# Patient Record
Sex: FEMALE | Race: WHITE | NOT HISPANIC OR LATINO | Employment: FULL TIME | URBAN - METROPOLITAN AREA
[De-identification: names, ages, dates, MRNs, and addresses within clinical notes are randomized per-mention and may not be internally consistent; named-entity substitution may affect disease eponyms.]

---

## 2017-05-04 ENCOUNTER — TELEPHONE (OUTPATIENT)
Dept: OBGYN | Facility: CLINIC | Age: 27
End: 2017-05-04

## 2017-05-04 NOTE — TELEPHONE ENCOUNTER
Pt lvm on nurse line stating she has been taking the birth control we gave her since she delivered in October 2016 and she has not gotten her period in 2 months and we are responsible since we gave her the birth control, I returned her call and pt says we need to see her because we gave her the birth control. I let pt know she would need to establish as a GYN patient or to see her primary care, she started yelling saying we have to see her I explained again that we will see her but she needs an appt as a new GYN pt. She continued to yell and curse then she hung up.

## 2017-07-02 ENCOUNTER — HOSPITAL ENCOUNTER (EMERGENCY)
Facility: MEDICAL CENTER | Age: 27
End: 2017-07-02
Attending: EMERGENCY MEDICINE
Payer: OTHER GOVERNMENT

## 2017-07-02 VITALS
RESPIRATION RATE: 14 BRPM | OXYGEN SATURATION: 98 % | HEIGHT: 67 IN | DIASTOLIC BLOOD PRESSURE: 73 MMHG | WEIGHT: 249.12 LBS | TEMPERATURE: 98.3 F | HEART RATE: 80 BPM | SYSTOLIC BLOOD PRESSURE: 122 MMHG | BODY MASS INDEX: 39.1 KG/M2

## 2017-07-02 DIAGNOSIS — J02.9 SORE THROAT: ICD-10-CM

## 2017-07-02 LAB
DEPRECATED S PYO AG THROAT QL EIA: NORMAL
SIGNIFICANT IND 70042: NORMAL
SITE SITE: NORMAL
SOURCE SOURCE: NORMAL

## 2017-07-02 PROCEDURE — 99283 EMERGENCY DEPT VISIT LOW MDM: CPT

## 2017-07-02 PROCEDURE — 87880 STREP A ASSAY W/OPTIC: CPT

## 2017-07-02 PROCEDURE — 87081 CULTURE SCREEN ONLY: CPT

## 2017-07-02 ASSESSMENT — PAIN SCALES - GENERAL: PAINLEVEL_OUTOF10: 8

## 2017-07-02 NOTE — ED AVS SNAPSHOT
Psynova Neurotech Access Code: Activation code not generated  Current Psynova Neurotech Status: Active    [employer]  A secure, online tool to manage your health information     [employer]’s Psynova Neurotech® is a secure, online tool that connects you to your personalized health information from the privacy of your home -- day or night - making it very easy for you to manage your healthcare. Once the activation process is completed, you can even access your medical information using the Psynova Neurotech kiran, which is available for free in the Apple Kiran store or Google Play store.     Psynova Neurotech provides the following levels of access (as shown below):   My Chart Features   [facility] Primary Care Doctor [facility]  Specialists [facility]  Urgent  Care Non-[facility]  Primary Care  Doctor   Email your healthcare team securely and privately 24/7 X X X X   Manage appointments: schedule your next appointment; view details of past/upcoming appointments X      Request prescription refills. X      View recent personal medical records, including lab and immunizations X X X X   View health record, including health history, allergies, medications X X X X   Read reports about your outpatient visits, procedures, consult and ER notes X X X X   See your discharge summary, which is a recap of your hospital and/or ER visit that includes your diagnosis, lab results, and care plan. X X       How to register for Psynova Neurotech:  1. Go to  [email or web address].  2. Click on the Sign Up Now box, which takes you to the New Member Sign Up page. You will need to provide the following information:  a. Enter your Psynova Neurotech Access Code exactly as it appears at the top of this page. (You will not need to use this code after you’ve completed the sign-up process. If you do not sign up before the expiration date, you must request a new code.)   b. Enter your date of birth.   c. Enter your home email address.   d. Click Submit, and follow the next screen’s instructions.  3. Create a Psynova Neurotech ID. This will  be your [employer] login ID and cannot be changed, so think of one that is secure and easy to remember.  4. Create a [employer] password. You can change your password at any time.  5. Enter your Password Reset Question and Answer. This can be used at a later time if you forget your password.   6. Enter your e-mail address. This allows you to receive e-mail notifications when new information is available in [employer].  7. Click Sign Up. You can now view your health information.    For assistance activating your [employer] account, call [phone]

## 2017-07-02 NOTE — ED AVS SNAPSHOT
Home Care Instructions                                                                                                                Marie Gan   MRN: [id number]    Department:  [facility], Emergency Dept   Date of Visit:  7/2/2017            [facility], Emergency Dept    [address] [address]    Phone:  [phone]      You were seen by     Teddy Bui D.O.      Your Diagnosis Was     Sore throat     J02.9       Follow-up Information     1. Follow up with [facility]. Call in 1 day.    Contact information    [address] [address] [phone]        2. Follow up with [facility], Emergency Dept.    Specialty:  Emergency Medicine    Why:  If symptoms worsen    Contact information    [address] [facility] [address] [phone]      Medication Information     Review all of your home medications and newly ordered medications with your primary doctor and/or pharmacist as soon as possible. Follow medication instructions as directed by your doctor and/or pharmacist.     Please keep your complete medication list with you and share with your physician. Update the information when medications are discontinued, doses are changed, or new medications (including over-the-counter products) are added; and carry medication information at all times in the event of emergency situations.               Medication List      ASK your doctor about these medications        Instructions    Morning Afternoon Evening Bedtime    norgestimate-ethinyl estradiol 0.25-35 MG-MCG per tablet   Commonly known as:  ORTHO-CYCLEN        Take 1 Tab by mouth every day.   Dose:  1 Tab                                Procedures and tests performed during your visit     BETA STREP SCREEN (GP. A)    RAPID STREP, CULT IF INDICATED (CULTURE IF NEGATIVE)        Discharge Instructions       Pharyngitis  Pharyngitis is redness, pain, and  swelling (inflammation) of your pharynx.   CAUSES   Pharyngitis is usually caused by infection. Most of the time, these infections are from viruses (viral) and are part of a cold. However, sometimes pharyngitis is caused by bacteria (bacterial). Pharyngitis can also be caused by allergies. Viral pharyngitis may be spread from person to person by coughing, sneezing, and personal items or utensils (cups, forks, spoons, toothbrushes). Bacterial pharyngitis may be spread from person to person by more intimate contact, such as kissing.   SIGNS AND SYMPTOMS   Symptoms of pharyngitis include:    · Sore throat.    · Tiredness (fatigue).    · Low-grade fever.    · Headache.  · Joint pain and muscle aches.  · Skin rashes.  · Swollen lymph nodes.  · Plaque-like film on throat or tonsils (often seen with bacterial pharyngitis).  DIAGNOSIS   Your health care provider will ask you questions about your illness and your symptoms. Your medical history, along with a physical exam, is often all that is needed to diagnose pharyngitis. Sometimes, a rapid strep test is done. Other lab tests may also be done, depending on the suspected cause.   TREATMENT   Viral pharyngitis will usually get better in 3-4 days without the use of medicine. Bacterial pharyngitis is treated with medicines that kill germs (antibiotics).   HOME CARE INSTRUCTIONS   · Drink enough water and fluids to keep your urine clear or pale yellow.    · Only take over-the-counter or prescription medicines as directed by your health care provider:    · If you are prescribed antibiotics, make sure you finish them even if you start to feel better.    · Do not take aspirin.    · Get lots of rest.    · Gargle with 8 oz of salt water (½ tsp of salt per 1 qt of water) as often as every 1-2 hours to soothe your throat.    · Throat lozenges (if you are not at risk for choking) or sprays may be used to soothe your throat.  SEEK MEDICAL CARE IF:   · You have large, tender lumps in  your neck.  · You have a rash.  · You cough up green, yellow-brown, or bloody spit.  SEEK IMMEDIATE MEDICAL CARE IF:   · Your neck becomes stiff.  · You drool or are unable to swallow liquids.  · You vomit or are unable to keep medicines or liquids down.  · You have severe pain that does not go away with the use of recommended medicines.  · You have trouble breathing (not caused by a stuffy nose).  MAKE SURE YOU:   · Understand these instructions.  · Will watch your condition.  · Will get help right away if you are not doing well or get worse.     This information is not intended to replace advice given to you by your health care provider. Make sure you discuss any questions you have with your health care provider.     Document Released: 12/18/2006 Document Revised: 10/08/2014 Document Reviewed: 08/25/2014  [employer] Interactive Patient Education ©2016 Elsevier Inc.    Salt Water Gargle  This solution will help make your mouth and throat feel better.  HOME CARE INSTRUCTIONS   · Mix 1 teaspoon of salt in 8 ounces of warm water.  · Gargle with this solution as much or often as you need or as directed. Swish and gargle gently if you have any sores or wounds in your mouth.  · Do not swallow this mixture.     This information is not intended to replace advice given to you by your health care provider. Make sure you discuss any questions you have with your health care provider.     Document Released: 09/21/2005 Document Revised: 03/11/2013 Document Reviewed: 02/12/2010  [employer] Patient Education ©2016 Elsevier Inc.    Sore Throat  A sore throat is pain, burning, irritation, or scratchiness of the throat. There is often pain or tenderness when swallowing or talking. A sore throat may be accompanied by other symptoms, such as coughing, sneezing, fever, and swollen neck glands. A sore throat is often the first sign of another sickness, such as a cold, flu, strep throat, or mononucleosis (commonly known as mono).  Most sore throats go away without medical treatment.  CAUSES   The most common causes of a sore throat include:  · A viral infection, such as a cold, flu, or mono.  · A bacterial infection, such as strep throat, tonsillitis, or whooping cough.  · Seasonal allergies.  · Dryness in the air.  · Irritants, such as smoke or pollution.  · Gastroesophageal reflux disease (GERD).  HOME CARE INSTRUCTIONS   · Only take over-the-counter medicines as directed by your caregiver.  · Drink enough fluids to keep your urine clear or pale yellow.  · Rest as needed.  · Try using throat sprays, lozenges, or sucking on hard candy to ease any pain (if older than 4 years or as directed).  · Sip warm liquids, such as broth, herbal tea, or warm water with honey to relieve pain temporarily. You may also eat or drink cold or frozen liquids such as frozen ice pops.  · Gargle with salt water (mix 1 tsp salt with 8 oz of water).  · Do not smoke and avoid secondhand smoke.  · Put a cool-mist humidifier in your bedroom at night to moisten the air. You can also turn on a hot shower and sit in the bathroom with the door closed for 5-10 minutes.  SEEK IMMEDIATE MEDICAL CARE IF:  · You have difficulty breathing.  · You are unable to swallow fluids, soft foods, or your saliva.  · You have increased swelling in the throat.  · Your sore throat does not get better in 7 days.  · You have nausea and vomiting.  · You have a fever or persistent symptoms for more than 2-3 days.  · You have a fever and your symptoms suddenly get worse.  MAKE SURE YOU:   · Understand these instructions.  · Will watch your condition.  · Will get help right away if you are not doing well or get worse.     This information is not intended to replace advice given to you by your health care provider. Make sure you discuss any questions you have with your health care provider.     Document Released: 01/25/2006 Document Revised: 01/08/2016 Document Reviewed: 08/25/2013  [place]  Interactive Patient Education ©2016 Elsevier Inc.            Patient Information     Patient Information    Following emergency treatment: all patient requiring follow-up care must return either to a private physician or a clinic if your condition worsens before you are able to obtain further medical attention, please return to the emergency room.     Billing Information    At [facility], we work to make the billing process streamlined for our patients.  Our Representatives are here to answer any questions you may have regarding your hospital bill.  If you have insurance coverage and have supplied your insurance information to us, we will submit a claim to your insurer on your behalf.  Should you have any questions regarding your bill, we can be reached online or by phone as follows:  Online: You are able pay your bills online or live chat with our representatives about any billing questions you may have. We are here to help Monday - Friday from 8:00am to 7:30pm and 9:00am - 12:00pm on Saturdays.  Please visit [email or web address]  for more information.   Phone:  [phone] or [phone]    Please note that your emergency physician, surgeon, pathologist, radiologist, anesthesiologist, and other specialists are not employed by [facility] and will therefore bill separately for their services.  Please contact them directly for any questions concerning their bills at the numbers below:     Emergency Physician Services:  [phone]  [facility]:  [phone]  [facility]:  [phone]  [facility]:  [phone]    1. Your final bill may vary from the amount quoted upon discharge if all procedures are not complete at that time, or if your doctor has additional procedures of which we are not aware. You will receive an additional bill if you return to the Emergency Department at [facility] for suture removal regardless of the  facility of which the sutures were placed.     2. Please arrange for settlement of this account at the emergency registration.    3. All self-pay accounts are due in full at the time of treatment.  If you are unable to meet this obligation then payment is expected within 4-5 days.     4. If you have had radiology studies (CT, X-ray, Ultrasound, MRI), you have received a preliminary result during your emergency department visit. Please contact the radiology department [phone] to receive a copy of your final result. Please discuss the Final result with your primary physician or with the follow up physician provided.     Crisis Hotline:  [place] Crisis Hotline:  1-882-OCFWKAY or 1-847.600.5622  [place] Crisis Hotline:    1-205.754.5027 or 446-352-6144         ED Discharge Follow Up Questions    1. In order to provide you with very good care, we would like to follow up with a phone call in the next few days.  May we have your permission to contact you?     YES /  NO    2. What is the best phone number to call you? (       )_____-__________    3. What is the best time to call you?      Morning  /  Afternoon  /  Evening                   Patient Signature:  ____________________________________________________________    Date:  ____________________________________________________________

## 2017-07-02 NOTE — ED AVS SNAPSHOT
7/2/2017    Marie Gan  [address]   [place] [address]    Dear Marie:    [facility] wants to ensure your discharge home is safe and you or your loved ones have had all of your questions answered regarding your care after you leave the hospital.    Below is a list of resources and contact information should you have any questions regarding your hospital stay, follow-up instructions, or active medical symptoms.    Questions or Concerns Regarding… Contact   Medical Questions Related to Your Discharge  (7 days a week, 8am-5pm) Contact a Nurse Care Coordinator   [phone]   Medical Questions Not Related to Your Discharge  (24 hours a day / 7 days a week)  Contact the Nurse Health Line   [phone]    Medications or Discharge Instructions Refer to your discharge packet   or contact your [facility] Primary Care Provider   [phone]   Follow-up Appointment(s) Schedule your appointment via [employer]   or contact Scheduling [phone]   Billing Review your statement via [employer]  or contact Billing [phone]   Medical Records Review your records via [employer]   or contact Medical Records [phone]     You may receive a telephone call within two days of discharge. This call is to make certain you understand your discharge instructions and have the opportunity to have any questions answered. You can also easily access your medical information, test results and upcoming appointments via the [employer] free online health management tool. You can learn more and sign up at [employer]. For assistance setting up your [employer] account, please call [phone].    Once again, we want to ensure your discharge home is safe and that you have a clear understanding of any next steps in your care. If you have any questions or concerns, please do not hesitate to contact us, we are here for you. Thank you for choosing [facility] for your healthcare needs.    Sincerely,    Your [facility] Healthcare Team

## 2017-07-03 NOTE — ED NOTES
"Chief Complaint   Patient presents with   • Sore Throat     Pt got back from [facility] and had a sore throat, said her uvula was enlarged and red, and has white patches in the back of her throat.  Reports pain when swallowing. Back of throat appears red, white patches noted. Pt speaking in full sentences, maintaining airway.        /74 mmHg  Pulse 77  Temp(Src) 37.1 °C (98.7 °F)  Resp 18  Ht 1.702 m (5' 7\")  Wt 113 kg (249 lb 1.9 oz)  BMI 39.01 kg/m2  SpO2 98%      Pt Informed regarding triage process and verbalized understanding to inform triage tech or RN for any changes in condition.  Placed in lobby.    "

## 2017-07-03 NOTE — DISCHARGE INSTRUCTIONS
Pharyngitis  Pharyngitis is redness, pain, and swelling (inflammation) of your pharynx.   CAUSES   Pharyngitis is usually caused by infection. Most of the time, these infections are from viruses (viral) and are part of a cold. However, sometimes pharyngitis is caused by bacteria (bacterial). Pharyngitis can also be caused by allergies. Viral pharyngitis may be spread from person to person by coughing, sneezing, and personal items or utensils (cups, forks, spoons, toothbrushes). Bacterial pharyngitis may be spread from person to person by more intimate contact, such as kissing.   SIGNS AND SYMPTOMS   Symptoms of pharyngitis include:    · Sore throat.    · Tiredness (fatigue).    · Low-grade fever.    · Headache.  · Joint pain and muscle aches.  · Skin rashes.  · Swollen lymph nodes.  · Plaque-like film on throat or tonsils (often seen with bacterial pharyngitis).  DIAGNOSIS   Your health care provider will ask you questions about your illness and your symptoms. Your medical history, along with a physical exam, is often all that is needed to diagnose pharyngitis. Sometimes, a rapid strep test is done. Other lab tests may also be done, depending on the suspected cause.   TREATMENT   Viral pharyngitis will usually get better in 3-4 days without the use of medicine. Bacterial pharyngitis is treated with medicines that kill germs (antibiotics).   HOME CARE INSTRUCTIONS   · Drink enough water and fluids to keep your urine clear or pale yellow.    · Only take over-the-counter or prescription medicines as directed by your health care provider:    · If you are prescribed antibiotics, make sure you finish them even if you start to feel better.    · Do not take aspirin.    · Get lots of rest.    · Gargle with 8 oz of salt water (½ tsp of salt per 1 qt of water) as often as every 1-2 hours to soothe your throat.    · Throat lozenges (if you are not at risk for choking) or sprays may be used to soothe your throat.  SEEK MEDICAL  CARE IF:   · You have large, tender lumps in your neck.  · You have a rash.  · You cough up green, yellow-brown, or bloody spit.  SEEK IMMEDIATE MEDICAL CARE IF:   · Your neck becomes stiff.  · You drool or are unable to swallow liquids.  · You vomit or are unable to keep medicines or liquids down.  · You have severe pain that does not go away with the use of recommended medicines.  · You have trouble breathing (not caused by a stuffy nose).  MAKE SURE YOU:   · Understand these instructions.  · Will watch your condition.  · Will get help right away if you are not doing well or get worse.     This information is not intended to replace advice given to you by your health care provider. Make sure you discuss any questions you have with your health care provider.     Document Released: 12/18/2006 Document Revised: 10/08/2014 Document Reviewed: 08/25/2014  [employer] Interactive Patient Education ©2016 Elsevier Inc.    Salt Water Gargle  This solution will help make your mouth and throat feel better.  HOME CARE INSTRUCTIONS   · Mix 1 teaspoon of salt in 8 ounces of warm water.  · Gargle with this solution as much or often as you need or as directed. Swish and gargle gently if you have any sores or wounds in your mouth.  · Do not swallow this mixture.     This information is not intended to replace advice given to you by your health care provider. Make sure you discuss any questions you have with your health care provider.     Document Released: 09/21/2005 Document Revised: 03/11/2013 Document Reviewed: 02/12/2010  [employer] Patient Education ©2016 Elsevier Inc.    Sore Throat  A sore throat is pain, burning, irritation, or scratchiness of the throat. There is often pain or tenderness when swallowing or talking. A sore throat may be accompanied by other symptoms, such as coughing, sneezing, fever, and swollen neck glands. A sore throat is often the first sign of another sickness, such as a cold, flu, strep  throat, or mononucleosis (commonly known as mono). Most sore throats go away without medical treatment.  CAUSES   The most common causes of a sore throat include:  · A viral infection, such as a cold, flu, or mono.  · A bacterial infection, such as strep throat, tonsillitis, or whooping cough.  · Seasonal allergies.  · Dryness in the air.  · Irritants, such as smoke or pollution.  · Gastroesophageal reflux disease (GERD).  HOME CARE INSTRUCTIONS   · Only take over-the-counter medicines as directed by your caregiver.  · Drink enough fluids to keep your urine clear or pale yellow.  · Rest as needed.  · Try using throat sprays, lozenges, or sucking on hard candy to ease any pain (if older than 4 years or as directed).  · Sip warm liquids, such as broth, herbal tea, or warm water with honey to relieve pain temporarily. You may also eat or drink cold or frozen liquids such as frozen ice pops.  · Gargle with salt water (mix 1 tsp salt with 8 oz of water).  · Do not smoke and avoid secondhand smoke.  · Put a cool-mist humidifier in your bedroom at night to moisten the air. You can also turn on a hot shower and sit in the bathroom with the door closed for 5-10 minutes.  SEEK IMMEDIATE MEDICAL CARE IF:  · You have difficulty breathing.  · You are unable to swallow fluids, soft foods, or your saliva.  · You have increased swelling in the throat.  · Your sore throat does not get better in 7 days.  · You have nausea and vomiting.  · You have a fever or persistent symptoms for more than 2-3 days.  · You have a fever and your symptoms suddenly get worse.  MAKE SURE YOU:   · Understand these instructions.  · Will watch your condition.  · Will get help right away if you are not doing well or get worse.     This information is not intended to replace advice given to you by your health care provider. Make sure you discuss any questions you have with your health care provider.     Document Released: 01/25/2006 Document Revised:  01/08/2016 Document Reviewed: 08/25/2013  [employer] Interactive Patient Education ©2016 Elsevier Inc.

## 2017-07-03 NOTE — ED PROVIDER NOTES
ED Provider Note    Scribed for No att. providers found by Tiffanie Horner. 7/2/2017  8:38 PM    Primary care provider: Pcp Pt States None   History obtained from: Patient   History limited by: None     CHIEF COMPLAINT  Chief Complaint   Patient presents with   • Sore Throat        HPI  Marei Gan is a 26 y.o. female who presents to the ED complaining of a sore throat with associated painful swallowing starting earlier today when she woke up. Associated symptoms include congestion. Patient notes just getting back from camping when she noticed her uvula was enlarged, erythematous and swollen. Denies fever, runny nose, cough, dyspnea, nausea, vomiting, diarrhea, rash, urinary problems, pregnancy.      REVIEW OF SYSTEMS  Please see HPI for pertinent positives/negatives.     PAST MEDICAL HISTORY  History reviewed. No pertinent past medical history.     SURGICAL HISTORY  Past Surgical History   Procedure Laterality Date   • Primary c section  10/5/2016     Procedure: PRIMARY C SECTION;  Surgeon: Margarette Arriaga M.D.;  Location: LABOR AND DELIVERY;  Service:         SOCIAL HISTORY  Social History     Social History Main Topics   • Smoking status: Former Smoker   • Smokeless tobacco: Never Used      Comment: quit due to pregnancy 3/16   • Alcohol Use: No   • Drug Use: No   • Sexual Activity:     Partners: Male      Comment: None, unplanned pregnancy but desire        FAMILY HISTORY  Family History   Problem Relation Age of Onset   • Cancer Maternal Grandmother    • Cancer Paternal Grandmother      breast cancer   • Diabetes Paternal Grandfather         CURRENT MEDICATIONS  Home Medications     Reviewed by Kalani Dumont R.N. (Registered Nurse) on 07/02/17 at 2026  Med List Status: Complete    Medication Last Dose Status    norgestimate-ethinyl estradiol (ORTHO-CYCLEN) 0.25-35 MG-MCG per tablet  Active                 ALLERGIES  No Known Allergies     PHYSICAL EXAM  VITAL SIGNS: /74 mmHg   "Pulse 77  Temp(Src) 37.1 °C (98.7 °F)  Resp 18  Ht 1.702 m (5' 7\")  Wt 113 kg (249 lb 1.9 oz)  BMI 39.01 kg/m2  SpO2 98% @MEG[200176::@     Pulse ox interpretation: 98% I interpret this pulse ox as normal     Constitutional: Well developed, well nourished, alert in no apparent distress, nontoxic appearance    HENT: No external signs of trauma, normocephalic, bilateral external ears normal, oropharynx moist with mild bilateral tonsillar erythema, no rigidity, uvula midline without any swelling, patient with normal voice and speaking without any difficulty, nose normal    Eyes: PERRL, conjunctiva without erythema, no discharge, no icterus    Neck: Soft and supple, trachea midline, no stridor, no tenderness, no LAD, no JVD, good ROM    Cardiovascular: Regular rate and rhythm, no murmurs/rubs/gallops, strong distal pulses and good perfusion    Thorax & Lungs: No respiratory distress, CTAB, no chest tenderness    Abdomen: Soft, nontender, nondistended, no guarding, no rebound, normal BS    Extremities: No clubbing, no cyanosis, no edema, no gross deformity, good ROM, no tenderness, intact distal pulses with brisk cap refill    Skin: Warm, dry, no pallor/cyanosis, no rash noted    Lymphatic: No lymphadenopathy noted    Neuro: A/O times 3, no focal deficits noted    Psychiatric: Cooperative, normal mood and affect, normal judgement, appropriate for clinical situation        DIAGNOSTIC STUDIES / PROCEDURES    LABS  All labs reviewed by me.     Results for orders placed or performed during the hospital encounter of 07/02/17   RAPID STREP, CULT IF INDICATED (CULTURE IF NEGATIVE)   Result Value Ref Range    Significant Indicator NEG     Source THRT     Site THROAT     Rapid Strep Screen       Negative for Group A streptococcus.  A negative result may be obtained if the specimen is  inadequate or antigen concentration is below the  sensitivity of the test. This negative test will be followed  up with a culture as " requested.          COURSE & MEDICAL DECISION MAKING  Nursing notes, VS, PMSFHx reviewed in chart.       Differential diagnoses considered include but are not limited to: URI, pneumonia, bronchitis, influenza, laryngitis, pharyngitis/tonsillitis, epiglottitis, peritonsillar abscess, retropharyngeal abscess, sinusitis, mononucleosis, viral syndrome, allergic rhinitis, otitis media    8:40 PM - Patient was evaluated at [facility]. Discussed the treatment care plan with her which is to get a swab to rule out strep throat. Patient does not have any further questions at this time. Rapid strep was ordered to evaluate for strep throat.      9:33 PM - Recheck. Informed patient of her lab results which indicated negative strep. She was encouraged to gargle with salt water or mix a teaspoon or tablespoon of honey with warm tea or water for relief. Her symptoms should resolve within a couple of days but she was instructed to return to the ED if her symptoms do not resolve. She will be discharged home with an information sheet on pharyngitis.     Patient presents to the ED with above complaint. Rapid strep performed and was negative. Findings discussed with the patient. This is a pleasant well-appearing patient distress, nontoxic in appearance with normal vital signs. Her airway is patent without any signs of swelling or compromise. Given the history/exam/findings, I suspect she most likely has a viral pharyngitis. Patient was informed that we will contact her if her throat culture is positive for strep.    The patient is referred to a primary physician for blood pressure management, diabetic screening, and for all other preventative health concerns.       FINAL IMPRESSION  1. Sore throat           DISPOSITION  Patient will be discharged home in stable condition.       FOLLOW UP  [address] [address]  [phone]  Call in 1 day      [facility], Emergency Dept  [address]  [facility] [id number] [phone]    If symptoms worsen         OUTPATIENT MEDICATIONS  New Prescriptions    No medications on file           I, Tiffanie Horner (Scribe), am scribing for, and in the presence of, No att. providers found.    Electronically signed by: Tiffanie Horner (Scribe), 7/2/2017    I, No att. providers found personally performed the services described in this documentation, as scribed by Tiffanie Horner in my presence, and it is both accurate and complete.      Portions of this record were made with voice recognition software and by scribes.  Despite my review, spelling/grammar/context errors may still remain.  Interpretation of this chart should be taken in this context.

## 2017-07-03 NOTE — ED NOTES
Pt verbalized understanding of discharge and follow up instructions. VSS.  All questions answered, ambulates with steady gait to discharge.

## 2017-07-04 LAB
S PYO SPEC QL CULT: NORMAL
SIGNIFICANT IND 70042: NORMAL
SITE SITE: NORMAL
SOURCE SOURCE: NORMAL

## 2017-08-16 ENCOUNTER — NON-PROVIDER VISIT (OUTPATIENT)
Dept: OBGYN | Facility: CLINIC | Age: 27
End: 2017-08-16
Payer: MEDICAID

## 2017-08-16 DIAGNOSIS — Z32.01 POSITIVE PREGNANCY TEST: ICD-10-CM

## 2017-08-16 LAB
INT CON NEG: NEGATIVE
INT CON POS: POSITIVE
POC URINE PREGNANCY TEST: POSITIVE

## 2017-08-16 PROCEDURE — 81025 URINE PREGNANCY TEST: CPT | Performed by: OBSTETRICS & GYNECOLOGY

## 2017-09-11 ENCOUNTER — INITIAL PRENATAL (OUTPATIENT)
Dept: OBGYN | Facility: CLINIC | Age: 27
End: 2017-09-11
Payer: OTHER GOVERNMENT

## 2017-09-11 VITALS — DIASTOLIC BLOOD PRESSURE: 64 MMHG | WEIGHT: 246 LBS | BODY MASS INDEX: 38.53 KG/M2 | SYSTOLIC BLOOD PRESSURE: 114 MMHG

## 2017-09-11 DIAGNOSIS — N93.8 DUB (DYSFUNCTIONAL UTERINE BLEEDING): ICD-10-CM

## 2017-09-11 LAB — IN CLINIC OB SCAN: NORMAL

## 2017-09-11 PROCEDURE — 99214 OFFICE O/P EST MOD 30 MIN: CPT | Mod: 25 | Performed by: OBSTETRICS & GYNECOLOGY

## 2017-09-11 PROCEDURE — 76830 TRANSVAGINAL US NON-OB: CPT | Performed by: OBSTETRICS & GYNECOLOGY

## 2017-09-11 NOTE — PROGRESS NOTES
CC: Confirmation of Pregnancy    HPI: Pt is a 27 yo  lmp unknown (was taking ocps)  who presents for evaluation of amenorrhea.  She has had no bleeding since her last menses.  A urine pregnancy test was positive.  She denies vaginal spotting or pain.  She notes nausea and vomiting.      ROS: negative for dizziness, SOB, chest pain, palpitations, dysuria, vaginal discharge.    Blood pressure 114/64, weight 111.6 kg (246 lb), not currently breastfeeding.    GENERAL: Alert, in no apparent distress  PSYCHIATRIC: Appropriate affect, intact insight and judgement.  ABDOMEN: Soft, nontender, nondistended.  No palpable masses. No hepatosplenomegaly.   No rebound or guarding.  No inguinal lymphadenopathy.  BACK: No CVA tenderness  EXTREMITIES: No edema, no calf tenderness.    GENITOURINARY:  Normal external genitalia, no lesions.  Normal urethral meatus, no masses or tenderness.  Normal bladder without fullness or masses.  Vagina well estrogenized, no vaginal discharge or lesions.  Cervix normal length, nontender.  Uterus normal size, shape, and contour, nontender.  Adnexa nontender, no masses.  Normal anus and perineum.      TRANSVAGINAL ULTRASOUND - performed and interpreted by me    Single gestational sac present.  Yolk sac visualized.     Zimmerman intrauterine pregnancy with CRL measuring 2.24 cm, c/w 8+6/7 weeks EGA, positive fetal cardiac activity noted. EDC 18     No fluid in cul de sac.    Ovaries and cervix appear grossly normal. Cervical length = 3.87 cm.      ASSESSMENT/PLAN:  1. DUB visit - Zimmerman IUP at 8+6/7 wks.  Prenatal Vitamins.  F/U for NOB appointment 4 wks.  2. Hx of C/S at 28 wks for Cat 3 tracing.  Op report reviewed - no spontaneous labor, LSTCS.  3. N & V of pregnancy - handout given.

## 2017-10-06 ENCOUNTER — HOSPITAL ENCOUNTER (OUTPATIENT)
Facility: MEDICAL CENTER | Age: 27
End: 2017-10-06
Attending: PHYSICIAN ASSISTANT
Payer: OTHER GOVERNMENT

## 2017-10-06 ENCOUNTER — INITIAL PRENATAL (OUTPATIENT)
Dept: OBGYN | Facility: CLINIC | Age: 27
End: 2017-10-06
Payer: MEDICAID

## 2017-10-06 VITALS — WEIGHT: 245 LBS | BODY MASS INDEX: 38.37 KG/M2 | DIASTOLIC BLOOD PRESSURE: 70 MMHG | SYSTOLIC BLOOD PRESSURE: 110 MMHG

## 2017-10-06 DIAGNOSIS — Z34.82 PRENATAL CARE, SUBSEQUENT PREGNANCY IN SECOND TRIMESTER: ICD-10-CM

## 2017-10-06 DIAGNOSIS — O09.891 HISTORY OF PRETERM DELIVERY, CURRENTLY PREGNANT IN FIRST TRIMESTER: ICD-10-CM

## 2017-10-06 DIAGNOSIS — O09.92 SUPERVISION OF HIGH RISK PREGNANCY IN SECOND TRIMESTER: ICD-10-CM

## 2017-10-06 DIAGNOSIS — O09.92 SUPERVISION OF HIGH RISK PREGNANCY IN SECOND TRIMESTER: Primary | ICD-10-CM

## 2017-10-06 DIAGNOSIS — Z98.891 HISTORY OF C-SECTION: ICD-10-CM

## 2017-10-06 DIAGNOSIS — E66.09 OBESITY DUE TO EXCESS CALORIES WITHOUT SERIOUS COMORBIDITY, UNSPECIFIED CLASSIFICATION: ICD-10-CM

## 2017-10-06 PROBLEM — E66.9 OBESITY: Status: ACTIVE | Noted: 2017-10-06

## 2017-10-06 LAB
APPEARANCE UR: NORMAL
BILIRUB UR STRIP-MCNC: NORMAL MG/DL
COLOR UR AUTO: NORMAL
GLUCOSE UR STRIP.AUTO-MCNC: NEGATIVE MG/DL
KETONES UR STRIP.AUTO-MCNC: NEGATIVE MG/DL
LEUKOCYTE ESTERASE UR QL STRIP.AUTO: NORMAL
NITRITE UR QL STRIP.AUTO: NEGATIVE
PH UR STRIP.AUTO: 6 [PH] (ref 5–8)
PROT UR QL STRIP: NORMAL MG/DL
RBC UR QL AUTO: NORMAL
SP GR UR STRIP.AUTO: 1.01
UROBILINOGEN UR STRIP-MCNC: NORMAL MG/DL

## 2017-10-06 PROCEDURE — 81002 URINALYSIS NONAUTO W/O SCOPE: CPT | Performed by: PHYSICIAN ASSISTANT

## 2017-10-06 PROCEDURE — 90686 IIV4 VACC NO PRSV 0.5 ML IM: CPT | Performed by: PHYSICIAN ASSISTANT

## 2017-10-06 PROCEDURE — 87591 N.GONORRHOEAE DNA AMP PROB: CPT

## 2017-10-06 PROCEDURE — 87491 CHLMYD TRACH DNA AMP PROBE: CPT

## 2017-10-06 PROCEDURE — 59402 PR NEW OB HIGH RISK: CPT | Performed by: PHYSICIAN ASSISTANT

## 2017-10-06 PROCEDURE — 90471 IMMUNIZATION ADMIN: CPT | Performed by: PHYSICIAN ASSISTANT

## 2017-10-06 PROCEDURE — 88175 CYTOPATH C/V AUTO FLUID REDO: CPT

## 2017-10-06 RX ORDER — METOCLOPRAMIDE 10 MG/1
10 TABLET ORAL 4 TIMES DAILY
COMMUNITY
End: 2018-04-04

## 2017-10-06 NOTE — PROGRESS NOTES
NOB visit   Pt considering RC/S with BTL   Pt c/o nausea, and dome cramping. Denies any other complications.   # [phone]  FLU  vaccine given today, right deltoid. Screening checklist reviewed with pt.

## 2017-10-06 NOTE — LETTER
Cystic Fibrosis Carrier Testing  Marie Gan    The following information is about a blood test that can be done to determine if you and/or your partner carry the gene for cystic fibrosis.    WHAT IS CYSTIC FIBROSIS?  · Cystic fibrosis (CF) is an inherited disease that affects more than 25,000 American children and young adults.  · Symptoms of CF vary but include lung congestion, pneumonia, diarrhea and poor growth.  Most people with CF have severe medical problems and some die at a young age.  Others have so few symptoms they are unaware they have CF.  · CF does not affect intelligence.  · Although there is no cure for CF at this time, scientists are making progress in improving treatment and in searching for a cure.  In the past many people with CF  at a very young age.  Today, many are living into their 20’s and 30’s.    IS THERE A CHANCE MY BABY COULD HAVE CYSTIC FIBROSIS?  · You can have a child with CF even if there is no history in your family (see chart below).  · CF testing can help determine if you are a carrier and at risk to have a child with CF.  Note: if both parents are carriers, there is a 1 in 4 (25%) chance with each pregnancy that they will have a child with CF.  · Carriers have one normal CF gene and one altered CF gene.  · People with CF have two altered CF genes.  · Most people have two normal copies of the CF gene.    Approximate risk that a couple with no family history of cystic fibrosis will have a child with cystic fibrosis:    Ethnic background / Risk     couple:  1 in 2,500   couple:  1 in 15,000            couple:  1 in 8,000     American couple:  1 in 32,000     WHAT TESTING IS AVAILABLE?  · There is a blood test that can be done to find out if you or your partner is a carrier.  · It is important to understand that CF carrier testing does not detect all CF carriers.  · If the test shows that you are both CF carriers, you unborn baby can  be tested to find out if the baby has CF.    HOW MUCH DOES IT COST TO HAVE CYSTIC FIBROSIS CARRIER TESTING?  · Cost and insurance coverage for CF carrier testing vary depending upon the laboratory used and your insurance policy.  · The average cost for CF carrier testing is $780 per person.  · Your genetic counselor can provide you with more information about cystic fibrosis carrier testing.    _____  Yes, I am interested in discussing carrier testing with a genetic counselor.    _____  No, I am not interested in CF carrier testing or in receiving more information about CF carrier testing.      Client signature: ________________________________________  10/6/2017

## 2017-10-06 NOTE — PROGRESS NOTES
New ob visit. Pt sure of LMP and  confirms EDITA 4/17/18. Pt has hx of TAB without complications then primary C/S at 28wk for NRFHT - pt denies GDM, PIH or delivery complications. Pt went to hospital for decr FM and needed crash section as umbilical cord around fetus' neck. Baby doing very well now, though only a year old. Pt wants repeat C/S and BTL. Denies PMHx, no other SHx. No tob, etoh or drug use. Taking PNV and Reglan only. NKDA. Pt currently denies cramping, bleeding or pain. No FM.     PE: See below. Size c/w dates. Unable to hear FHT by Doppler, so BSUS done with single, viable IUP, +FM, +Cardiac activity at 160bpm.  PAP done by Medical student but overseen by myself.     A/P: 1. IUP at 12w3d - PAP, GC/CT today. PNL, AFP slip given to pt - to do after 15wk. US 6-7wk. RTC 4 wks. Flu vaccine given today  2. Hx PTD with C/S at 28 wk for NRFHT - desires repeat and BTL  3. Obese - 15lb weight gain recommended  4. Nausea - taking Reglan with good relief

## 2017-10-10 ENCOUNTER — TELEPHONE (OUTPATIENT)
Dept: OBGYN | Facility: CLINIC | Age: 27
End: 2017-10-10

## 2017-10-10 PROBLEM — R87.622 LGSIL PAP SMEAR OF VAGINA: Status: ACTIVE | Noted: 2017-10-10

## 2017-10-10 LAB
C TRACH DNA GENITAL QL NAA+PROBE: NEGATIVE
CYTOLOGY REG CYTOL: NORMAL
N GONORRHOEA DNA GENITAL QL NAA+PROBE: NEGATIVE
SPECIMEN SOURCE: NORMAL

## 2017-10-10 NOTE — TELEPHONE ENCOUNTER
----- Message from MADISON Coleman sent at 10/10/2017  9:00 AM PDT -----  Needs colpo please, also can tx BV if symptomatic at colpo appt.      10/10/17 0934 Pt notified of abnormal pap and pt states she has been having abnormal paps since she was 17y.o and last time she had an abnormal pap she had a colpo and she was told if this time is abnormal again she will have a colpo again. Explained to pt is actually what provider is recommending a colpo. Pt states she will call me back later today or tomorrow to let me know her schedule and that way we can schedule her colpo appt. Will wait for pt to call back to schedule colpo appt.     10/16/17 1305 pt has not return call. Left message for pt to call back.   10/20/17 1454 Left message for pt to call back.   10/24/17 pt have not return call. Will send letter today. AIDEN in chart and ppt desk.

## 2017-10-10 NOTE — LETTER
October 24, 2017          Dear Marie Gan,      Please call us regarding your care.  One of the nurses is available to speak with you Monday through Friday, 8 a.m to 11:30 a.m and 1 p.m. to 4 p.m.    Please call us at [phone]; thank you for your prompt attention.        Sincerely,    Luisa Guerra R.N.    Electronically Signed

## 2017-11-03 ENCOUNTER — ROUTINE PRENATAL (OUTPATIENT)
Dept: OBGYN | Facility: CLINIC | Age: 27
End: 2017-11-03
Payer: MEDICAID

## 2017-11-03 VITALS — DIASTOLIC BLOOD PRESSURE: 58 MMHG | BODY MASS INDEX: 37.75 KG/M2 | SYSTOLIC BLOOD PRESSURE: 116 MMHG | WEIGHT: 241 LBS

## 2017-11-03 DIAGNOSIS — Z98.891 HISTORY OF C-SECTION: ICD-10-CM

## 2017-11-03 DIAGNOSIS — O09.92 SUPERVISION OF HIGH RISK PREGNANCY IN SECOND TRIMESTER: ICD-10-CM

## 2017-11-03 DIAGNOSIS — O09.891 HISTORY OF PRETERM DELIVERY, CURRENTLY PREGNANT IN FIRST TRIMESTER: ICD-10-CM

## 2017-11-03 DIAGNOSIS — E66.09 OBESITY DUE TO EXCESS CALORIES WITHOUT SERIOUS COMORBIDITY, UNSPECIFIED CLASSIFICATION: ICD-10-CM

## 2017-11-03 DIAGNOSIS — R87.622 LGSIL PAP SMEAR OF VAGINA: ICD-10-CM

## 2017-11-03 PROCEDURE — 90040 PR PRENATAL FOLLOW UP: CPT | Performed by: NURSE PRACTITIONER

## 2017-11-03 NOTE — PROGRESS NOTES
Pt here today for OB follow up  Reports light flutters  WT: 241 lb  BP:  116/58  PNP and AFP not done yet, pt states she will get blood work next week on Monday.    Pt informed today regarding need for colposcopy. States no complaints today.    on 12/01  Good # [phone]

## 2017-11-03 NOTE — PROGRESS NOTES
S) Pt is a 26 y.o.   at 16w3d  gestation. Routine prenatal care today. No complaints. Discussed pap results and need for colpo. Will schedule today. Hasn't yet done blood work, but has appt for Monday.    Fetal movement Normal  Cramping no,  VB no  LOF no   Denies dysuria. Generally feels well today. Good self-care activities identified. Denies headaches, swelling, visual changes, or epigastric pain .     O) Blood pressure 116/58, weight 109.3 kg (241 lb), not currently breastfeeding.        Labs:       PNL: Not done       GCT: Too early       AFP: Not done yet       GBS: N/A       Pertinent ultrasound -        Scheduled 17    A) IUP at 16w3d       S=D         Patient Active Problem List    Diagnosis Date Noted   • History of C/S x 1 - considering repeat and BTL 10/06/2017     Priority: High   • LGSIL Pap smear of vagina 10/10/2017     Priority: Medium   • History of PTD at 28wk - C/S due to NRFHT 10/06/2017     Priority: Medium   • Supervision of high risk pregnancy in second trimester 10/06/2017     Priority: Medium   • Obesity 10/06/2017     Priority: Medium                 TDAP: no       FLU: no        BTL: yes       : no    P) s/s ptl vs general discomforts. Fetal movements reviewed. General ed and anticipatory guidance. Nutrition/exercise/vitamin. Plans breast Plans pp contraception- unsure  Continue PNV.

## 2017-11-14 ENCOUNTER — HOSPITAL ENCOUNTER (OUTPATIENT)
Dept: LAB | Facility: MEDICAL CENTER | Age: 27
End: 2017-11-14
Attending: PHYSICIAN ASSISTANT
Payer: OTHER GOVERNMENT

## 2017-11-14 DIAGNOSIS — O09.92 SUPERVISION OF HIGH RISK PREGNANCY IN SECOND TRIMESTER: ICD-10-CM

## 2017-11-14 LAB
ABO GROUP BLD: NORMAL
APPEARANCE UR: CLEAR
BACTERIA #/AREA URNS HPF: ABNORMAL /HPF
BASOPHILS # BLD AUTO: 0.3 % (ref 0–1.8)
BASOPHILS # BLD: 0.03 K/UL (ref 0–0.12)
BILIRUB UR QL STRIP.AUTO: NEGATIVE
BLD GP AB SCN SERPL QL: NORMAL
COLOR UR: ABNORMAL
CULTURE IF INDICATED INDCX: YES UA CULTURE
EOSINOPHIL # BLD AUTO: 0.05 K/UL (ref 0–0.51)
EOSINOPHIL NFR BLD: 0.6 % (ref 0–6.9)
EPI CELLS #/AREA URNS HPF: ABNORMAL /HPF
ERYTHROCYTE [DISTWIDTH] IN BLOOD BY AUTOMATED COUNT: 46.8 FL (ref 35.9–50)
GLUCOSE UR STRIP.AUTO-MCNC: NEGATIVE MG/DL
HBV SURFACE AG SER QL: NEGATIVE
HCT VFR BLD AUTO: 39.4 % (ref 37–47)
HGB BLD-MCNC: 12.9 G/DL (ref 12–16)
HIV 1+2 AB+HIV1 P24 AG SERPL QL IA: NON REACTIVE
HYALINE CASTS #/AREA URNS LPF: ABNORMAL /LPF
IMM GRANULOCYTES # BLD AUTO: 0.03 K/UL (ref 0–0.11)
IMM GRANULOCYTES NFR BLD AUTO: 0.3 % (ref 0–0.9)
KETONES UR STRIP.AUTO-MCNC: NEGATIVE MG/DL
LEUKOCYTE ESTERASE UR QL STRIP.AUTO: ABNORMAL
LYMPHOCYTES # BLD AUTO: 2.71 K/UL (ref 1–4.8)
LYMPHOCYTES NFR BLD: 29.9 % (ref 22–41)
MCH RBC QN AUTO: 26.5 PG (ref 27–33)
MCHC RBC AUTO-ENTMCNC: 32.7 G/DL (ref 33.6–35)
MCV RBC AUTO: 80.9 FL (ref 81.4–97.8)
MICRO URNS: ABNORMAL
MONOCYTES # BLD AUTO: 0.53 K/UL (ref 0–0.85)
MONOCYTES NFR BLD AUTO: 5.8 % (ref 0–13.4)
NEUTROPHILS # BLD AUTO: 5.71 K/UL (ref 2–7.15)
NEUTROPHILS NFR BLD: 63.1 % (ref 44–72)
NITRITE UR QL STRIP.AUTO: NEGATIVE
NRBC # BLD AUTO: 0 K/UL
NRBC BLD AUTO-RTO: 0 /100 WBC
PH UR STRIP.AUTO: 6 [PH]
PLATELET # BLD AUTO: 233 K/UL (ref 164–446)
PMV BLD AUTO: 11.4 FL (ref 9–12.9)
PROT UR QL STRIP: NEGATIVE MG/DL
RBC # BLD AUTO: 4.87 M/UL (ref 4.2–5.4)
RBC # URNS HPF: ABNORMAL /HPF
RBC UR QL AUTO: NEGATIVE
RH BLD: NORMAL
RUBV AB SER QL: 14.9 IU/ML
SP GR UR STRIP.AUTO: 1.03
TREPONEMA PALLIDUM IGG+IGM AB [PRESENCE] IN SERUM OR PLASMA BY IMMUNOASSAY: NON REACTIVE
UROBILINOGEN UR STRIP.AUTO-MCNC: 0.2 MG/DL
WBC # BLD AUTO: 9.1 K/UL (ref 4.8–10.8)
WBC #/AREA URNS HPF: ABNORMAL /HPF

## 2017-11-14 PROCEDURE — 86762 RUBELLA ANTIBODY: CPT

## 2017-11-14 PROCEDURE — 85025 COMPLETE CBC W/AUTO DIFF WBC: CPT

## 2017-11-14 PROCEDURE — 86901 BLOOD TYPING SEROLOGIC RH(D): CPT

## 2017-11-14 PROCEDURE — 81511 FTL CGEN ABNOR FOUR ANAL: CPT

## 2017-11-14 PROCEDURE — 36415 COLL VENOUS BLD VENIPUNCTURE: CPT

## 2017-11-14 PROCEDURE — 87086 URINE CULTURE/COLONY COUNT: CPT

## 2017-11-14 PROCEDURE — 86850 RBC ANTIBODY SCREEN: CPT

## 2017-11-14 PROCEDURE — 86780 TREPONEMA PALLIDUM: CPT

## 2017-11-14 PROCEDURE — 87340 HEPATITIS B SURFACE AG IA: CPT

## 2017-11-14 PROCEDURE — 87389 HIV-1 AG W/HIV-1&-2 AB AG IA: CPT

## 2017-11-14 PROCEDURE — 81001 URINALYSIS AUTO W/SCOPE: CPT

## 2017-11-14 PROCEDURE — 86900 BLOOD TYPING SEROLOGIC ABO: CPT

## 2017-11-16 DIAGNOSIS — B96.89 BV (BACTERIAL VAGINOSIS): ICD-10-CM

## 2017-11-16 DIAGNOSIS — N76.0 BV (BACTERIAL VAGINOSIS): ICD-10-CM

## 2017-11-16 LAB
BACTERIA UR CULT: ABNORMAL
BACTERIA UR CULT: ABNORMAL
SIGNIFICANT IND 70042: ABNORMAL
SOURCE SOURCE: ABNORMAL

## 2017-11-16 RX ORDER — METRONIDAZOLE 500 MG/1
500 TABLET ORAL 2 TIMES DAILY
Qty: 14 TAB | Refills: 0 | Status: SHIPPED | OUTPATIENT
Start: 2017-11-16 | End: 2018-04-04

## 2017-11-17 LAB
# FETUSES US: NORMAL
AFP MOM SERPL: 0.73
AFP SERPL-MCNC: 22 NG/ML
AGE - REPORTED: 26.3 YR
GA METHOD: NORMAL
GA: 18 WEEKS
HCG MOM SERPL: 0.8
HCG SERPL-ACNC: NORMAL IU/L
IDDM PATIENT QL: NO
INHIBIN A MOM SERPL: 1.28
INHIBIN A SERPL-MCNC: 163 PG/ML
INTEGRATED SCN PATIENT-IMP: NORMAL
PATHOLOGY STUDY: NORMAL
U ESTRIOL MOM SERPL: 1.11
U ESTRIOL SERPL-MCNC: 1.32 NG/ML

## 2017-11-21 ENCOUNTER — TELEPHONE (OUTPATIENT)
Dept: OBGYN | Facility: CLINIC | Age: 27
End: 2017-11-21

## 2017-11-21 NOTE — TELEPHONE ENCOUNTER
----- Message from ABDOULAYE Squires sent at 11/16/2017  3:04 PM PST -----  Please call patient and let her know that her urine culture shows  Gardnerella and that is vaginal discharge contamination and she needs to be treated with Metronidazole 500 mg BID x 7 days no sex for 7 days. I have already e scribed the meds to her pharmacy on file  Thanks roberto  11/21/17. Patient was notified. Will  Rx as soon as possible.

## 2017-11-28 ENCOUNTER — GYNECOLOGY VISIT (OUTPATIENT)
Dept: OBGYN | Facility: CLINIC | Age: 27
End: 2017-11-28
Payer: OTHER GOVERNMENT

## 2017-11-28 VITALS — BODY MASS INDEX: 36.96 KG/M2 | SYSTOLIC BLOOD PRESSURE: 118 MMHG | DIASTOLIC BLOOD PRESSURE: 70 MMHG | WEIGHT: 236 LBS

## 2017-11-28 DIAGNOSIS — O09.891 HISTORY OF PRETERM DELIVERY, CURRENTLY PREGNANT IN FIRST TRIMESTER: ICD-10-CM

## 2017-11-28 DIAGNOSIS — R87.622 LGSIL PAP SMEAR OF VAGINA: ICD-10-CM

## 2017-11-28 DIAGNOSIS — O09.92 SUPERVISION OF HIGH RISK PREGNANCY IN SECOND TRIMESTER: ICD-10-CM

## 2017-11-28 DIAGNOSIS — R87.618 OTHER ABNORMAL CYTOLOGICAL FINDING OF SPECIMEN FROM CERVIX: ICD-10-CM

## 2017-11-28 DIAGNOSIS — E66.09 OBESITY DUE TO EXCESS CALORIES WITHOUT SERIOUS COMORBIDITY, UNSPECIFIED CLASSIFICATION: ICD-10-CM

## 2017-11-28 DIAGNOSIS — Z98.891 HISTORY OF C-SECTION: ICD-10-CM

## 2017-11-28 PROCEDURE — 90040 PR PRENATAL FOLLOW UP: CPT | Performed by: OBSTETRICS & GYNECOLOGY

## 2017-11-28 PROCEDURE — 57454 BX/CURETT OF CERVIX W/SCOPE: CPT | Performed by: OBSTETRICS & GYNECOLOGY

## 2017-11-28 NOTE — LETTER
COLPOSCOPY / LEEP DATA SHEET    Marie Gan     1990      27 y.o.      No LMP recorded (lmp unknown). Patient is pregnant.     @EDC@       Medical history:   o MVP    o Blood dyscrasia    o Rh     o Other: .    STD history:    o HPV     o HSV     o HIV     o GC     o Chlamydia     o None     o Other: .    HIV:   o Positive     o Negative                            IV Drug User:   o  Yes    o  No .    Age of first coitus:                                                Number of sex partners in lifetime:  .    Reason for exam:.    Prior abnormal PAPS?    o  Yes  o  No        Treatment: .    Prior history of condyloma?    o  Yes  o  No        Treatment:.     Colposcopic view - description:                                 Satisfactory?    o  Yes  o  No                    Squamo-columnar junction seen?  o  Yes  o  No.    Entire lesion seen?     o  Yes  o  No          PAP done?  o  Yes  o  No           Biopsies done?  o  Yes  o  No.    Biopsy sites:.    ECC done?    o  Yes  o  No      If no, reason:.    Impression:.    Recommendations:.             Colposcopist: ______________________________________________ Date: ___________________

## 2017-11-28 NOTE — PROGRESS NOTES
Marie Gan is a 27 y.o.  at 20w0d here today for obstetrical visit.  Patient is without complaints.    She reports good fetal movement.  She denies vaginal bleeding.  She denies rupture of membranes.  She denies contractions.     has History of C/S x 1 - considering repeat and BTL; History of PTD at 28wk - C/S due to NRFHT; Supervision of high risk pregnancy in second trimester; Obesity; LGSIL Pap smear of vagina; and BV (bacterial vaginosis) on her problem list.    Indications for colposcopy are reviewed with patient  Pap smear shows Mildly abnormal (LGSIL - encompassing HPV,mild dysplasia,EZEQUIEL I).  Urine pregnancy test is positive  Risks of colposcopy are discussed and informed consent is signed  The patient is placed in dorsal lithotomy position, speculum is inserted into the vagina and the cervix was visualized  Acetic acid is applied to the cervix  The colposcope was then used to evaluate the cervix  The squamocolumnar junction is seen in its entirety  Findings for colposcopy are: Few acetowhite areas noted on the anterior lip of the cervix consistent with EZEQUIEL-1  ECC is not performed  Colposcopy is considered adequate  Hemostasis is achieved with Monsel solution  Patient tolerated the procedure well  Please see animation located in media, colposcopy/LEEP data sheet    The patient should followup in 1-2 weeks for results review  Pelvic rest x1 week        A/P IUP at 20w0d  AFP done  1 hour glucola   Rhogam a pos  GBS     F/U in 4 weeks

## 2017-11-28 NOTE — PROGRESS NOTES
Pt here for OB/FU COLPO Reports Good Fetal Movement.  Pt states no complications today.   U/S on 12/1/17  # [phone]

## 2017-12-01 ENCOUNTER — ROUTINE PRENATAL (OUTPATIENT)
Dept: OBGYN | Facility: CLINIC | Age: 27
End: 2017-12-01
Payer: OTHER GOVERNMENT

## 2017-12-01 ENCOUNTER — APPOINTMENT (OUTPATIENT)
Dept: RADIOLOGY | Facility: IMAGING CENTER | Age: 27
End: 2017-12-01
Attending: PHYSICIAN ASSISTANT
Payer: OTHER GOVERNMENT

## 2017-12-01 VITALS — SYSTOLIC BLOOD PRESSURE: 116 MMHG | BODY MASS INDEX: 36.96 KG/M2 | WEIGHT: 236 LBS | DIASTOLIC BLOOD PRESSURE: 72 MMHG

## 2017-12-01 DIAGNOSIS — O09.92 SUPERVISION OF HIGH RISK PREGNANCY IN SECOND TRIMESTER: ICD-10-CM

## 2017-12-01 DIAGNOSIS — R30.0 DYSURIA IN PREGNANCY IN SECOND TRIMESTER: ICD-10-CM

## 2017-12-01 DIAGNOSIS — Z98.891 HISTORY OF C-SECTION: ICD-10-CM

## 2017-12-01 DIAGNOSIS — R87.622 LGSIL PAP SMEAR OF VAGINA: ICD-10-CM

## 2017-12-01 DIAGNOSIS — O26.892 DYSURIA IN PREGNANCY IN SECOND TRIMESTER: ICD-10-CM

## 2017-12-01 DIAGNOSIS — O09.891 HISTORY OF PRETERM DELIVERY, CURRENTLY PREGNANT IN FIRST TRIMESTER: ICD-10-CM

## 2017-12-01 LAB
APPEARANCE UR: NORMAL
BILIRUB UR STRIP-MCNC: NORMAL MG/DL
COLOR UR AUTO: NORMAL
GLUCOSE UR STRIP.AUTO-MCNC: NEGATIVE MG/DL
KETONES UR STRIP.AUTO-MCNC: NEGATIVE MG/DL
LEUKOCYTE ESTERASE UR QL STRIP.AUTO: NORMAL
NITRITE UR QL STRIP.AUTO: NEGATIVE
PH UR STRIP.AUTO: 5 [PH] (ref 5–8)
PROT UR QL STRIP: NORMAL MG/DL
RBC UR QL AUTO: NEGATIVE
SP GR UR STRIP.AUTO: 1.01
UROBILINOGEN UR STRIP-MCNC: NORMAL MG/DL

## 2017-12-01 PROCEDURE — 90040 PR PRENATAL FOLLOW UP: CPT | Performed by: NURSE PRACTITIONER

## 2017-12-01 PROCEDURE — 81002 URINALYSIS NONAUTO W/O SCOPE: CPT | Performed by: NURSE PRACTITIONER

## 2017-12-01 PROCEDURE — 76805 OB US >/= 14 WKS SNGL FETUS: CPT | Performed by: OBSTETRICS & GYNECOLOGY

## 2017-12-01 NOTE — PROGRESS NOTES
SUBJECTIVE:  Pt is a 27 y.o.   at 20w3d  gestation. Presents today for follow-up prenatal care. Reports no issues at this time.  Reports fetal movement. Denies cramping/contractions, bleeding or leaking of fluid. Denies headaches, N/V. Reports pain with urination and pressure. Reports taking Metronidazole for 5 days 2x a day.     OBJECTIVE:  - See prenatal vitals flow  Vitals:    17 1031   BP: 116/72   Weight: 107 kg (236 lb)      - Pertinent Labs: PNP and AFP WNL, Pap LGSIL with f/u colpo advised f/u Pp,   - Pertinent ultrasound: done today, limited exam and questionable positioning of the heart in the right chest           ASSESSMENT:   - IUP at 20w3d by 8  week US   - S=D  - Urine dip today shows only trace leuks and trace protein    Patient Active Problem List    Diagnosis Date Noted   • History of C/S x 1 - considering repeat and BTL 10/06/2017     Priority: High   • LGSIL Pap smear of vagina 10/10/2017     Priority: Medium   • History of PTD at 28wk - C/S due to NRFHT 10/06/2017     Priority: Medium   • Supervision of high risk pregnancy in second trimester 10/06/2017     Priority: Medium   • Obesity 10/06/2017     Priority: Medium   • BV (bacterial vaginosis) 2017         PLAN:  - Patient wanting tubal and wondering about signing earlier due to hx of  at 28 weeks  - Urine culture for persisting urinary symptoms; retreat pending results   - S/sx pregnancy and labor warning signs vs general discomforts discussed  - Fetal movements and kick counts reviewed   - Adequate hydration reinforced  - Nutrition/exercise/vitamin education: continued PNV  - S/p Flu vacc  - Anticipatory guidance given  - RTC in 4 weeks for follow-up prenatal care with MD

## 2017-12-01 NOTE — PROGRESS NOTES
Pt. Here for OB/FU today. Reports Good FM.   U/S on 12/1/17  Good # [phone]  Pt states last took UTI medication a few days ago but states still feeling some abdominal pain   Not sure if it is still from UTI  Pharmacy verified.

## 2017-12-04 ENCOUNTER — TELEPHONE (OUTPATIENT)
Dept: OBGYN | Facility: CLINIC | Age: 27
End: 2017-12-04

## 2017-12-04 NOTE — TELEPHONE ENCOUNTER
----- Message from Marimar Spangler M.D. sent at 12/4/2017  9:09 AM PST -----  Please inform patient I have ordered a repeat US to better assess the fetal heart.  US to be done in 2-3 weeks.  12/4/17@ 9:48am. N/a, msg left for patient to call back.  12/6/17@2:08pm. N/a, msg left for patient to call back. Letter sent.  12/19/17@9:19am. Patient called back. Was informed as above. Call transferred to Alethea AYALA To schedule appt.

## 2017-12-04 NOTE — LETTER
December 6, 2017          Dear Marie Gan,      Please call us regarding your care.  One of the nurses is available to speak with you Monday through Friday, 8 a.m. to 5 p.m.    Please call us at [phone]; thank you for your prompt attention.        Sincerely,          Margarette LEBRON MA  Electronically Signed

## 2017-12-19 ENCOUNTER — HOSPITAL ENCOUNTER (OUTPATIENT)
Dept: RADIOLOGY | Facility: MEDICAL CENTER | Age: 27
End: 2017-12-19
Attending: OBSTETRICS & GYNECOLOGY
Payer: MEDICAID

## 2017-12-19 DIAGNOSIS — O09.92 SUPERVISION OF HIGH RISK PREGNANCY IN SECOND TRIMESTER: ICD-10-CM

## 2017-12-19 PROCEDURE — 76815 OB US LIMITED FETUS(S): CPT

## 2017-12-20 ENCOUNTER — HOSPITAL ENCOUNTER (OUTPATIENT)
Dept: LAB | Facility: MEDICAL CENTER | Age: 27
End: 2017-12-20
Attending: NURSE PRACTITIONER
Payer: MEDICAID

## 2017-12-20 DIAGNOSIS — O09.92 SUPERVISION OF HIGH RISK PREGNANCY IN SECOND TRIMESTER: ICD-10-CM

## 2017-12-20 DIAGNOSIS — O09.891 HISTORY OF PRETERM DELIVERY, CURRENTLY PREGNANT IN FIRST TRIMESTER: ICD-10-CM

## 2017-12-20 DIAGNOSIS — Z98.891 HISTORY OF C-SECTION: ICD-10-CM

## 2017-12-20 DIAGNOSIS — R87.622 LGSIL PAP SMEAR OF VAGINA: ICD-10-CM

## 2017-12-20 PROCEDURE — 87086 URINE CULTURE/COLONY COUNT: CPT

## 2017-12-22 LAB
BACTERIA UR CULT: NORMAL
SIGNIFICANT IND 70042: NORMAL
SITE SITE: NORMAL
SOURCE SOURCE: NORMAL

## 2018-01-05 ENCOUNTER — ROUTINE PRENATAL (OUTPATIENT)
Dept: OBGYN | Facility: CLINIC | Age: 28
End: 2018-01-05
Payer: MEDICAID

## 2018-01-05 VITALS — BODY MASS INDEX: 36.96 KG/M2 | DIASTOLIC BLOOD PRESSURE: 58 MMHG | WEIGHT: 236 LBS | SYSTOLIC BLOOD PRESSURE: 92 MMHG

## 2018-01-05 DIAGNOSIS — O09.891 HISTORY OF PRETERM DELIVERY, CURRENTLY PREGNANT IN FIRST TRIMESTER: ICD-10-CM

## 2018-01-05 DIAGNOSIS — Z98.891 HISTORY OF C-SECTION: ICD-10-CM

## 2018-01-05 DIAGNOSIS — R87.622 LGSIL PAP SMEAR OF VAGINA: ICD-10-CM

## 2018-01-05 DIAGNOSIS — O09.92 SUPERVISION OF HIGH RISK PREGNANCY IN SECOND TRIMESTER: ICD-10-CM

## 2018-01-05 DIAGNOSIS — E66.9 OBESITY, UNSPECIFIED CLASSIFICATION, UNSPECIFIED OBESITY TYPE, UNSPECIFIED WHETHER SERIOUS COMORBIDITY PRESENT: ICD-10-CM

## 2018-01-05 PROCEDURE — 90040 PR PRENATAL FOLLOW UP: CPT | Performed by: NURSE PRACTITIONER

## 2018-01-05 NOTE — PROGRESS NOTES
Pt here today for OB follow up  Pt states no complaints  Reports +JEAN PIERRE Hickey # [phone]  Pharmacy Confirmed.  U/S 12/19  1 Hr Gtt

## 2018-01-05 NOTE — PROGRESS NOTES
SUBJECTIVE:  Pt is a 27 y.o.   at 25w3d  gestation. Presents today for follow-up prenatal care. Reports no issues at this time.  Reports good  fetal movement. Denies cramping/contractions, bleeding or leaking of fluid. Denies dysuria, headaches, N/V, or other issues at this time. Generally feels well today.     OBJECTIVE:  - See prenatal vitals flow  -   Vitals:    18 1314   BP: (!) 92/58   Weight: 107 kg (236 lb)      - Pertinent Labs:normal pnp, normal AFP  - Pertinent ultrasound: Normal fetal survey            ASSESSMENT:   - IUP at 25w3d    - S=D   -   Patient Active Problem List    Diagnosis Date Noted   • History of C/S x 1 - considering repeat and BTL 10/06/2017     Priority: High   • LGSIL Pap smear of vagina 10/10/2017     Priority: Medium   • History of PTD at 28wk - C/S due to NRFHT 10/06/2017     Priority: Medium   • Supervision of high risk pregnancy in second trimester 10/06/2017     Priority: Medium   • Obesity 10/06/2017     Priority: Medium   • BV (bacterial vaginosis) 2017         PLAN:  - S/sx pregnancy and labor warning signs vs general discomforts discussed  - Fetal movements and kick counts reviewed   - Adequate hydration reinforced  - Nutrition/exercise/vitamin education; continued PNV   BTL papers signed early as patient desires and has hx of  delivery at 28 weeks. FYI placed to have them signed again later in the pregnancy.

## 2018-01-14 ENCOUNTER — HOSPITAL ENCOUNTER (OUTPATIENT)
Facility: MEDICAL CENTER | Age: 28
End: 2018-01-14
Attending: OBSTETRICS & GYNECOLOGY | Admitting: OBSTETRICS & GYNECOLOGY
Payer: MEDICAID

## 2018-01-14 VITALS
DIASTOLIC BLOOD PRESSURE: 66 MMHG | HEIGHT: 67 IN | HEART RATE: 98 BPM | SYSTOLIC BLOOD PRESSURE: 116 MMHG | RESPIRATION RATE: 16 BRPM | TEMPERATURE: 97.8 F
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
## 2018-01-24 ENCOUNTER — HOSPITAL ENCOUNTER (OUTPATIENT)
Dept: LAB | Facility: MEDICAL CENTER | Age: 28
End: 2018-01-24
Attending: NURSE PRACTITIONER
Payer: MEDICAID

## 2018-01-24 DIAGNOSIS — O09.92 SUPERVISION OF HIGH RISK PREGNANCY IN SECOND TRIMESTER: ICD-10-CM

## 2018-01-24 LAB
HCT VFR BLD AUTO: 38.9 % (ref 37–47)
HGB BLD-MCNC: 12.4 G/DL (ref 12–16)
TREPONEMA PALLIDUM IGG+IGM AB [PRESENCE] IN SERUM OR PLASMA BY IMMUNOASSAY: NON REACTIVE

## 2018-01-24 PROCEDURE — 85014 HEMATOCRIT: CPT

## 2018-01-24 PROCEDURE — 86780 TREPONEMA PALLIDUM: CPT

## 2018-01-24 PROCEDURE — 85018 HEMOGLOBIN: CPT

## 2018-01-24 PROCEDURE — 82950 GLUCOSE TEST: CPT

## 2018-01-24 PROCEDURE — 36415 COLL VENOUS BLD VENIPUNCTURE: CPT

## 2018-01-25 LAB — GLUCOSE 1H P 50 G GLC PO SERPL-MCNC: 125 MG/DL (ref 70–139)

## 2018-01-26 ENCOUNTER — ROUTINE PRENATAL (OUTPATIENT)
Dept: OBGYN | Facility: CLINIC | Age: 28
End: 2018-01-26
Payer: MEDICAID

## 2018-01-26 VITALS — WEIGHT: 234 LBS | SYSTOLIC BLOOD PRESSURE: 110 MMHG | BODY MASS INDEX: 36.65 KG/M2 | DIASTOLIC BLOOD PRESSURE: 68 MMHG

## 2018-01-26 DIAGNOSIS — E66.9 OBESITY, UNSPECIFIED CLASSIFICATION, UNSPECIFIED OBESITY TYPE, UNSPECIFIED WHETHER SERIOUS COMORBIDITY PRESENT: ICD-10-CM

## 2018-01-26 DIAGNOSIS — R87.622 LGSIL PAP SMEAR OF VAGINA: ICD-10-CM

## 2018-01-26 DIAGNOSIS — O09.891 HISTORY OF PRETERM DELIVERY, CURRENTLY PREGNANT IN FIRST TRIMESTER: ICD-10-CM

## 2018-01-26 DIAGNOSIS — Z98.891 HISTORY OF C-SECTION: ICD-10-CM

## 2018-01-26 DIAGNOSIS — O09.92 SUPERVISION OF HIGH RISK PREGNANCY IN SECOND TRIMESTER: ICD-10-CM

## 2018-01-26 PROCEDURE — 90715 TDAP VACCINE 7 YRS/> IM: CPT | Performed by: NURSE PRACTITIONER

## 2018-01-26 PROCEDURE — 90040 PR PRENATAL FOLLOW UP: CPT | Performed by: NURSE PRACTITIONER

## 2018-01-26 PROCEDURE — 90471 IMMUNIZATION ADMIN: CPT | Performed by: NURSE PRACTITIONER

## 2018-01-26 ASSESSMENT — PATIENT HEALTH QUESTIONNAIRE - PHQ9: CLINICAL INTERPRETATION OF PHQ2 SCORE: 0

## 2018-01-26 NOTE — PROGRESS NOTES
Ob f/u. + fetal movement good  No VB, LOF or contractions   C/O no complaints today   Phone number # [phone]  Pharmacy verified with patient  WT=  234 lbs            AI=[id number]  DEEPTHI given today with instructions   Tdap vaccine given. 01/26/2018 Right  Deltoid. VIS given and screening check list reviewed with pt.  NABILA signed today

## 2018-01-26 NOTE — LETTER
"Count Your Baby's Movements  Another step to a healthy delivery  Donna Gan             Dept: [phone]    How Many Weeks Pregnant? 28w3d    Date to Begin Countin2018              How to use this chart    One way for your physician to keep track of your baby's health is by knowing how often the baby moves (or \"kicks\") in your womb.  You can help your physician to do this by using this chart every day.    Every day, you should see how many hours it takes for your baby to move 10 times.  Start in the morning, as soon as you get up.    · First, write down the time your baby moves until you get to 10.  · Check off one box every time your baby moves until you get to 10.  · Write down the time you finished counting in the last column.  · Total how long it took to count up all 10 movements.  · Finally, fill in the box that shows how long this took.  After counting 10 movements, you no longer have to count any more that day.  The next morning, just start counting again as soon as you get up.    What should you call a \"movement\"?  It is hard to say, because it will feel different from one mother to another and from one pregnancy to the next.  The important thing is that you count the movements the same way throughout your pregnancy.  If you have more questions, you should ask your physician.    Count carefully every day!  SAMPLE:  Week 28    How many hours did it take to feel 10 movements?       Start  Time     1     2     3     4     5     6     7     8     9     10   Finish Time   Mon 8:20 ·  ·  ·  ·  ·  ·  ·  ·  ·  ·  11:40                  Sat               Sun                 IMPORTANT: You should contact your physician if it takes more than two hours for you to feel 10 movements.  Each morning, write down the time and start to count the movements of your baby.  Keep track by checking off one box every time you feel one movement.  When you have felt " "10 \"kicks\", write down the time you finished counting in the last column.  Then fill in the   box (over the check clemente) for the number of hours it took.  Be sure to read the complete instructions on the previous page.            "

## 2018-01-26 NOTE — PROGRESS NOTES
SUBJECTIVE:  Pt is a 27 y.o.   at 28w3d  gestation. Presents today for follow-up prenatal care. Reports no issues at this time.  Reports good fetal movement. Denies cramping/contractions, bleeding or leaking of fluid. Denies dysuria, headaches, N/V, or other issues at this time. Generally feels well today.     OBJECTIVE:  - See prenatal vitals flow  -   Vitals:    18 1524   BP: 110/68   Weight: 106.1 kg (234 lb)                 ASSESSMENT:   - IUP at 28w3d by 8 week US   - S=D   -   Patient Active Problem List    Diagnosis Date Noted   • History of C/S x 1 - considering repeat and BTL 10/06/2017     Priority: High   • LGSIL Pap smear of vagina 10/10/2017     Priority: Medium   • History of PTD at 28wk - C/S due to NRFHT 10/06/2017     Priority: Medium   • Supervision of high risk pregnancy in second trimester 10/06/2017     Priority: Medium   • Obesity 10/06/2017     Priority: Medium   • BV (bacterial vaginosis) 2017         PLAN:  - Repeat c/s with BTL   - S/sx pregnancy and labor warning signs vs general discomforts discussed  - Fetal movements and kick counts reviewed   - Adequate hydration reinforced  - Nutrition/exercise/vitamin education; continued PNV  - Plans BTL for contraception Pp  - S/p TDAP vacc today   - S/p Flu vacc  - Anticipatory guidance given  - RTC in 2 weeks for follow-up prenatal care

## 2018-02-02 ENCOUNTER — HOSPITAL ENCOUNTER (OUTPATIENT)
Facility: MEDICAL CENTER | Age: 28
End: 2018-02-02
Attending: OBSTETRICS & GYNECOLOGY | Admitting: OBSTETRICS & GYNECOLOGY
Payer: MEDICAID

## 2018-02-02 VITALS
WEIGHT: 234 LBS | BODY MASS INDEX: 36.65 KG/M2 | HEART RATE: 107 BPM | SYSTOLIC BLOOD PRESSURE: 119 MMHG | DIASTOLIC BLOOD PRESSURE: 66 MMHG | TEMPERATURE: 97.9 F

## 2018-02-03 NOTE — PROGRESS NOTES
[facility] DELIVERY TRIAGE PROGRESS NOTE    PATIENT ID:  NAME:  Marie Gan  MRN:               [id number]  YOB: 1990     27 y.o. female  at 29w3d.    Subjective: Pt presents complaining of reduced fetal movement. Mother is especially concerned because she has a history of  CS for non reassuring FHT at 28 weeks. She states that she was working yesterday but did not feel baby move during that time. No other complaints today.   Pregnancy complicated by: History of previous  CS at 28 weeks for NRFHT    negative  For CTXS.   negative Feels pain   negative for LOF  negative for vaginal bleeding.   positive for fetal movement (reduced as above)     ROS: Patient denies any fever chills, nausea, vomiting, headache, chest pain, shortness of breath, or dysuria or unusual swelling of hands or feet.     Objective:    Vitals:    18 1555 18 1600   BP:  119/66   Pulse:  (!) 107   Temp:  36.6 °C (97.9 °F)   Weight: 106.1 kg (234 lb)      Temp (24hrs), Av.6 °C (97.9 °F), Min:36.6 °C (97.9 °F), Max:36.6 °C (97.9 °F)    General: No acute distress, resting comfortably in bed.  HEENT: normocephalic, nontraumatic, PERRLA, EOMI  Cardiovascular: Heart RRR with no murmurs, rubs or gallops. Distal Pulses 2+  Respiratory: symmetric chest expansion, lungs CTAB, with no wheezes, rales, rhonci  Abdomen: gravid, nontender  Musculoskeletal: strength 5/5 in four extremities  Neuro: non focal with no numbness, tingling or changes in sensation    Cervix:  Not examine   [place]: Uterine Contractions: No contractions   FHRM: Reactive NST    Assessment: 27 y.o. female  at 29w3d, here for reduced fetal movement. Patient was evaluated on L&D for reduced fetal movement. Was found to have a reactive NST and felt baby move while in triage.     Plan:   1. Patient is cleared to return home with family. Encouraged to see MD for increased painful uterine contractions @ 3-5, vaginal bleeding, loss of  fluid, or other serious symptoms.  2. Strict return precautions reviewed, patient verbalized understanding    Discussed case with Dr. Pacheco, [facility] Attending. Case was discussed and attending agreed with plan prior to discharge of patient.    Rafael Byrne M.D.

## 2018-02-03 NOTE — PROGRESS NOTES
, EDITA , 29.3 weeks  Pt presents to L&D with c/o of absent FM since yesterday morning.  Pt concerned due to her last pregnancy and emergent C/S at 28 weeks. Pt reports that she moved yesterday and did help with that. Pt denies LOF, VB, UCs. EFM/[place] placed. VSS.  Dr. Byrne notified.   1635: Dr. Byrne at bedside. Orders received for discharge.  1640: Pt discharged home via ambulation. General L&D instructions given with emphasis on fetal kick counts. Pt verbalized understanding.

## 2018-02-08 ENCOUNTER — ROUTINE PRENATAL (OUTPATIENT)
Dept: OBGYN | Facility: CLINIC | Age: 28
End: 2018-02-08
Payer: MEDICAID

## 2018-02-08 VITALS — SYSTOLIC BLOOD PRESSURE: 110 MMHG | WEIGHT: 234 LBS | BODY MASS INDEX: 36.65 KG/M2 | DIASTOLIC BLOOD PRESSURE: 68 MMHG

## 2018-02-08 DIAGNOSIS — O09.891 HISTORY OF PRETERM DELIVERY, CURRENTLY PREGNANT IN FIRST TRIMESTER: ICD-10-CM

## 2018-02-08 DIAGNOSIS — R87.622 LGSIL PAP SMEAR OF VAGINA: ICD-10-CM

## 2018-02-08 DIAGNOSIS — O09.92 SUPERVISION OF HIGH RISK PREGNANCY IN SECOND TRIMESTER: ICD-10-CM

## 2018-02-08 DIAGNOSIS — Z98.891 HISTORY OF C-SECTION: ICD-10-CM

## 2018-02-08 DIAGNOSIS — E66.9 OBESITY, UNSPECIFIED CLASSIFICATION, UNSPECIFIED OBESITY TYPE, UNSPECIFIED WHETHER SERIOUS COMORBIDITY PRESENT: ICD-10-CM

## 2018-02-08 PROCEDURE — 90040 PR PRENATAL FOLLOW UP: CPT | Performed by: NURSE PRACTITIONER

## 2018-02-08 ASSESSMENT — PATIENT HEALTH QUESTIONNAIRE - PHQ9: CLINICAL INTERPRETATION OF PHQ2 SCORE: 0

## 2018-02-08 NOTE — PROGRESS NOTES
Ob f/u. + fetal movement good  No VB, LOF or contractions   C/O legs cramping   Phone number # [phone]  Pharmacy verified with patient  WT= 234 lbs             DK=[id number]

## 2018-02-08 NOTE — PROGRESS NOTES
SUBJECTIVE:  Pt is a 27 y.o.   at 30w2d  gestation. Presents today for follow-up prenatal care. Reports no issues at this time.  Reports good  fetal movement. Denies cramping/contractions, bleeding or leaking of fluid. Denies dysuria, headaches, N/V, or other issues at this time. Generally feels well today.     OBJECTIVE:  - See prenatal vitals flow  -   Vitals:    18 1343   BP: 110/68   Weight: 106.1 kg (234 lb)             Labs - normal prenatal panel, normal afp, glucose neg       Normal fetal survey            ASSESSMENT:   - IUP at 30w2d   - S=D   -   Patient Active Problem List    Diagnosis Date Noted   • History of - desires repeat with BTL 10/06/2017     Priority: High   • LGSIL Pap smear of vagina 10/10/2017     Priority: Medium   • History of PTD at 28wk - C/S due to NRFHT 10/06/2017     Priority: Medium   • Supervision of high risk pregnancy in second trimester 10/06/2017     Priority: Medium   • Obesity 10/06/2017     Priority: Medium   • BV (bacterial vaginosis) 2017         PLAN:  - S/sx pregnancy and labor warning signs vs general discomforts discussed  - Fetal movements and kick counts reviewed   - Adequate hydration reinforced  - Nutrition/exercise/vitamin education; continued PNV

## 2018-02-21 ENCOUNTER — ROUTINE PRENATAL (OUTPATIENT)
Dept: OBGYN | Facility: CLINIC | Age: 28
End: 2018-02-21
Payer: MEDICAID

## 2018-02-21 VITALS — WEIGHT: 238 LBS | DIASTOLIC BLOOD PRESSURE: 68 MMHG | SYSTOLIC BLOOD PRESSURE: 118 MMHG | BODY MASS INDEX: 37.28 KG/M2

## 2018-02-21 DIAGNOSIS — R87.622 LGSIL PAP SMEAR OF VAGINA: ICD-10-CM

## 2018-02-21 DIAGNOSIS — O09.92 SUPERVISION OF HIGH RISK PREGNANCY IN SECOND TRIMESTER: ICD-10-CM

## 2018-02-21 DIAGNOSIS — E66.9 OBESITY, UNSPECIFIED CLASSIFICATION, UNSPECIFIED OBESITY TYPE, UNSPECIFIED WHETHER SERIOUS COMORBIDITY PRESENT: ICD-10-CM

## 2018-02-21 DIAGNOSIS — O09.891 HISTORY OF PRETERM DELIVERY, CURRENTLY PREGNANT IN FIRST TRIMESTER: ICD-10-CM

## 2018-02-21 DIAGNOSIS — Z98.891 HISTORY OF C-SECTION: ICD-10-CM

## 2018-02-21 PROCEDURE — 90040 PR PRENATAL FOLLOW UP: CPT | Performed by: NURSE PRACTITIONER

## 2018-02-21 NOTE — PROGRESS NOTES
Pt here today for OB follow up  Pt states no complaints   Reports +  Good # [phone]  Pharmacy Confirmed.

## 2018-02-21 NOTE — PROGRESS NOTES
SUBJECTIVE:  Pt is a 27 y.o.   at 32w1d  gestation. Presents today for follow-up prenatal care. Reports no issues at this time.  Reports good fetal movement. Denies cramping/contractions, bleeding or leaking of fluid. Denies dysuria, headaches, N/V, or other issues at this time. Generally feels well today. Some nasal congestion without fever or other issues.     OBJECTIVE:  - See prenatal vitals flow  Vitals:    18 1354   BP: 118/68   Weight: 108 kg (238 lb)                 ASSESSMENT:   - IUP at 32w1d by 8 week US   - S=D   -   Patient Active Problem List    Diagnosis Date Noted   • History of - desires repeat with BTL 10/06/2017     Priority: High   • LGSIL Pap smear of vagina 10/10/2017     Priority: Medium   • History of PTD at 28wk - C/S due to NRFHT 10/06/2017     Priority: Medium   • Supervision of high risk pregnancy in second trimester 10/06/2017     Priority: Medium   • Obesity 10/06/2017     Priority: Medium   • BV (bacterial vaginosis) 2017         PLAN:  - S/sx pregnancy and labor warning signs vs general discomforts discussed  - Fetal movements and kick counts reviewed   - Adequate hydration reinforced  - Nutrition/exercise/vitamin education; continued PNV  - S/p TDAP vacc  - S/p Flu vacc  - Anticipatory guidance given  - RTC in 2 weeks for follow-up prenatal care

## 2018-03-08 ENCOUNTER — ROUTINE PRENATAL (OUTPATIENT)
Dept: OBGYN | Facility: CLINIC | Age: 28
End: 2018-03-08
Payer: MEDICAID

## 2018-03-08 VITALS — DIASTOLIC BLOOD PRESSURE: 56 MMHG | BODY MASS INDEX: 38.69 KG/M2 | WEIGHT: 247 LBS | SYSTOLIC BLOOD PRESSURE: 102 MMHG

## 2018-03-08 DIAGNOSIS — O09.891 HISTORY OF PRETERM DELIVERY, CURRENTLY PREGNANT IN FIRST TRIMESTER: ICD-10-CM

## 2018-03-08 DIAGNOSIS — O09.92 SUPERVISION OF HIGH RISK PREGNANCY IN SECOND TRIMESTER: Primary | ICD-10-CM

## 2018-03-08 DIAGNOSIS — Z98.891 HISTORY OF C-SECTION: ICD-10-CM

## 2018-03-08 DIAGNOSIS — E66.9 OBESITY, UNSPECIFIED CLASSIFICATION, UNSPECIFIED OBESITY TYPE, UNSPECIFIED WHETHER SERIOUS COMORBIDITY PRESENT: ICD-10-CM

## 2018-03-08 DIAGNOSIS — R87.622 LGSIL PAP SMEAR OF VAGINA: ICD-10-CM

## 2018-03-08 PROCEDURE — 90040 PR PRENATAL FOLLOW UP: CPT | Performed by: NURSE PRACTITIONER

## 2018-03-09 NOTE — PROGRESS NOTES
Ob f/u. + fetal movement good  No VB, LOF or contractions   C/O pelvic pain   Phone number # [phone]  Pharmacy verified with patient  OW=620 lbs              MW=211/56

## 2018-03-09 NOTE — PROGRESS NOTES
S) Pt is a 27 y.o.   at 34w2d  gestation. Routine prenatal care today. No complaints today. C/S referral placed today. Labor precautions discussed, GBS next visit. All questions answered.    Fetal movement Normal  Cramping no  VB no  LOF no   Denies dysuria. Generally feels well today. Good self-care activities identified. Denies headaches, swelling, visual changes, or epigastric pain .     O) Blood pressure 102/56, weight 112 kg (247 lb), not currently breastfeeding.        Labs:       PNL: WNL       GCT: 125       AFP: normal       GBS: Next visit       Pertinent ultrasound -        17- Survey WNL, but not able to visualize cardiac structures well, MEHDI 14.73cm, c/w prev dating. Needs rescan of cardiac structures  17- Survey WNL, MEHDI 10.7cm, c/w prev dating    A) IUP at 34w2d       S=D         Patient Active Problem List    Diagnosis Date Noted   • History of - desires repeat with BTL 10/06/2017     Priority: High   • LGSIL Pap smear of vagina 10/10/2017     Priority: Medium   • History of PTD at 28wk - C/S due to NRFHT 10/06/2017     Priority: Medium   • Supervision of high risk pregnancy in second trimester 10/06/2017     Priority: Medium   • Obesity 10/06/2017     Priority: Medium   • BV (bacterial vaginosis) 2017          SVE: deferred         TDAP: yes       FLU: yes        BTL: yes       : no       C/S Consent: yes       IOL or C/S scheduled: no, referral placed today       LAST PAP: 10/6/17- LGSIL         P) s/s ptl vs general discomforts. Fetal movements reviewed. General ed and anticipatory guidance. Nutrition/exercise/vitamin. Plans breast Plans pp contraception- BTL with C/S  Continue PNV.

## 2018-03-13 ENCOUNTER — ROUTINE PRENATAL (OUTPATIENT)
Dept: OBGYN | Facility: CLINIC | Age: 28
End: 2018-03-13
Payer: MEDICAID

## 2018-03-13 ENCOUNTER — HOSPITAL ENCOUNTER (OUTPATIENT)
Facility: MEDICAL CENTER | Age: 28
End: 2018-03-13
Attending: NURSE PRACTITIONER
Payer: MEDICAID

## 2018-03-13 VITALS — BODY MASS INDEX: 39.31 KG/M2 | WEIGHT: 251 LBS | SYSTOLIC BLOOD PRESSURE: 129 MMHG | DIASTOLIC BLOOD PRESSURE: 72 MMHG

## 2018-03-13 DIAGNOSIS — E66.9 OBESITY, UNSPECIFIED CLASSIFICATION, UNSPECIFIED OBESITY TYPE, UNSPECIFIED WHETHER SERIOUS COMORBIDITY PRESENT: ICD-10-CM

## 2018-03-13 DIAGNOSIS — O09.891 HISTORY OF PRETERM DELIVERY, CURRENTLY PREGNANT IN FIRST TRIMESTER: ICD-10-CM

## 2018-03-13 DIAGNOSIS — R87.622 LGSIL PAP SMEAR OF VAGINA: ICD-10-CM

## 2018-03-13 DIAGNOSIS — O09.93 SUPERVISION OF HIGH RISK PREGNANCY IN THIRD TRIMESTER: ICD-10-CM

## 2018-03-13 DIAGNOSIS — Z98.891 HISTORY OF C-SECTION: ICD-10-CM

## 2018-03-13 PROBLEM — N76.0 BV (BACTERIAL VAGINOSIS): Status: RESOLVED | Noted: 2017-11-16 | Resolved: 2018-03-13

## 2018-03-13 PROBLEM — B96.89 BV (BACTERIAL VAGINOSIS): Status: RESOLVED | Noted: 2017-11-16 | Resolved: 2018-03-13

## 2018-03-13 PROCEDURE — 87653 STREP B DNA AMP PROBE: CPT

## 2018-03-13 PROCEDURE — 90040 PR PRENATAL FOLLOW UP: CPT | Performed by: NURSE PRACTITIONER

## 2018-03-13 NOTE — PROGRESS NOTES
SUBJECTIVE:  Pt is a 27 y.o.   at 35w0d  gestation. Presents today for follow-up prenatal care. Reports no issues at this time.  Reports good fetal movement. Denies cramping/contractions, bleeding or leaking of fluid. Denies dysuria, headaches, N/V, or other issues at this time. Generally feels well today.     OBJECTIVE:  - See prenatal vitals flow  Vitals:    18 1549   BP: 129/72   Weight: 113.9 kg (251 lb)                 ASSESSMENT:   - IUP at 35w0d by LMP which is consistent with 19 week US   - S=D  Patient Active Problem List    Diagnosis Date Noted   • History of - desires repeat with BTL 10/06/2017     Priority: High   • LGSIL Pap smear of vagina 10/10/2017     Priority: Medium   • History of PTD at 28wk - C/S due to NRFHT 10/06/2017     Priority: Medium   • Supervision of high risk pregnancy in third trimester 10/06/2017     Priority: Medium   • Obesity 10/06/2017     Priority: Medium         PLAN:  - GBS collected   - BTL signed   - S/sx pregnancy and labor warning signs vs general discomforts discussed  - Fetal movements and kick counts reviewed   - Adequate hydration reinforced  - Nutrition/exercise/vitamin education; continued PNV  - S/p TDAP vacc  - S/p Flu vacc  - Anticipatory guidance given  - RTC in 1 weeks for follow-up prenatal care

## 2018-03-13 NOTE — PROGRESS NOTES
Pt here today for OB follow up  Pt states having hand and feet swelling  Reports +FM  Good # [phone]  Pharmacy Confirmed.  GBS today.  Pt resigned BTL consent from today.

## 2018-03-14 LAB — GP B STREP DNA SPEC QL NAA+PROBE: NEGATIVE

## 2018-03-14 NOTE — PROGRESS NOTES
Patient is scheduled for Pre OP on 04/10/18 at 3:45pm with Dr García  Patient is scheduled for C/S with BTL on 04/11/18 at 9:30am with Dr García  Please give instructions next visit. thanks

## 2018-03-21 ENCOUNTER — ROUTINE PRENATAL (OUTPATIENT)
Dept: OBGYN | Facility: CLINIC | Age: 28
End: 2018-03-21
Payer: MEDICAID

## 2018-03-21 VITALS — BODY MASS INDEX: 39.78 KG/M2 | DIASTOLIC BLOOD PRESSURE: 74 MMHG | SYSTOLIC BLOOD PRESSURE: 128 MMHG | WEIGHT: 254 LBS

## 2018-03-21 DIAGNOSIS — Z98.891 HISTORY OF C-SECTION: ICD-10-CM

## 2018-03-21 DIAGNOSIS — R87.622 LGSIL PAP SMEAR OF VAGINA: ICD-10-CM

## 2018-03-21 DIAGNOSIS — O09.891 HISTORY OF PRETERM DELIVERY, CURRENTLY PREGNANT IN FIRST TRIMESTER: ICD-10-CM

## 2018-03-21 DIAGNOSIS — O09.93 SUPERVISION OF HIGH RISK PREGNANCY IN THIRD TRIMESTER: ICD-10-CM

## 2018-03-21 PROCEDURE — 90040 PR PRENATAL FOLLOW UP: CPT | Performed by: NURSE PRACTITIONER

## 2018-03-21 NOTE — PROGRESS NOTES
Pt here today for OB follow up  Pt  stating pain in hands and feet  Reports +FM  Good # [phone]  Pharmacy Confirmed.  Patient is scheduled for Pre OP on 04/10/18 at 3:45pm with Dr García  Patient is scheduled for C/S with BTL on 04/11/18 at 9:30am with Dr García. Instructions given.

## 2018-03-21 NOTE — PROGRESS NOTES
SUBJECTIVE:  Pt is a 27 y.o.   at 36w1d  gestation. Presents today for follow-up prenatal care. Reports no issues at this time.  Reports good fetal movement. Denies cramping/contractions, bleeding or leaking of fluid. Denies dysuria, headaches, N/V, or other issues at this time. Generally feels well today.     OBJECTIVE:  - See prenatal vitals flow  Vitals:    18 1442   BP: 128/74   Weight: 115.2 kg (254 lb)                 ASSESSMENT:   - IUP at 36w1d by 8 week US   - S=D  Patient Active Problem List    Diagnosis Date Noted   • History of - desires repeat with BTL 10/06/2017     Priority: High   • LGSIL Pap smear of vagina 10/10/2017     Priority: Medium   • History of PTD at 28wk - C/S due to NRFHT 10/06/2017     Priority: Medium   • Supervision of high risk pregnancy in third trimester 10/06/2017     Priority: Medium         PLAN:  - S/sx pregnancy and labor warning signs vs general discomforts discussed  - Fetal movements and kick counts reviewed   - Adequate hydration reinforced  - Nutrition/exercise/vitamin education; continued PNV   - S/p TDAP vacc  - S/p Flu vacc  - Anticipatory guidance given  - RTC in 1 weeks for follow-up prenatal care; c/s instructions given

## 2018-03-26 ENCOUNTER — ROUTINE PRENATAL (OUTPATIENT)
Dept: OBGYN | Facility: CLINIC | Age: 28
End: 2018-03-26
Payer: MEDICAID

## 2018-03-26 VITALS — SYSTOLIC BLOOD PRESSURE: 100 MMHG | BODY MASS INDEX: 39.16 KG/M2 | DIASTOLIC BLOOD PRESSURE: 72 MMHG | WEIGHT: 250 LBS

## 2018-03-26 DIAGNOSIS — O09.891 HISTORY OF PRETERM DELIVERY, CURRENTLY PREGNANT IN FIRST TRIMESTER: ICD-10-CM

## 2018-03-26 DIAGNOSIS — R87.622 LGSIL PAP SMEAR OF VAGINA: ICD-10-CM

## 2018-03-26 DIAGNOSIS — O09.93 SUPERVISION OF HIGH RISK PREGNANCY IN THIRD TRIMESTER: ICD-10-CM

## 2018-03-26 DIAGNOSIS — Z98.891 HISTORY OF C-SECTION: ICD-10-CM

## 2018-03-26 PROCEDURE — 90040 PR PRENATAL FOLLOW UP: CPT | Performed by: NURSE PRACTITIONER

## 2018-03-26 NOTE — PROGRESS NOTES
Pt here today for OB follow up  Pt states no complaints  Reports +  Good # [phone]  Pharmacy Confirmed.  Patient is scheduled for Pre OP on 04/10/18 at 3:45pm with Dr García  Patient is scheduled for C/S with BTL on 04/11/18 at 9:30am with Dr García

## 2018-03-26 NOTE — PROGRESS NOTES
S:  Pt is  at 36w6d here for routine OB follow up.  No concerns today. Has been given pre-op instructions.  Reports good FM.  Denies VB, LOF, RUCs, or vaginal DC.     O:  Please see above vitals.        FHTs: 139        Fundal ht: 37 cm        Fetal position: vertex        SVE: deferred        GBS negative -- reviewed w pt.      A:  IUP at 36w6d  Patient Active Problem List    Diagnosis Date Noted   • History of - desires repeat with BTL 10/06/2017     Priority: High   • LGSIL Pap smear of vagina 10/10/2017     Priority: Medium   • History of PTD at 28wk - C/S due to NRFHT 10/06/2017     Priority: Medium   • Supervision of high risk pregnancy in third trimester 10/06/2017     Priority: Medium       P:  1.  Continue FKCs.         2.  Labor precautions given.  Instructions given on where to go.  Pt receptive to education.         3.  D/w pt C/S policy.  Pre-op instructions given at last visit       4.  Questions answered.         5.  Encouraged adequate water intake       6.  F/u 1wk

## 2018-04-04 ENCOUNTER — APPOINTMENT (OUTPATIENT)
Dept: ADMISSIONS | Facility: MEDICAL CENTER | Age: 28
End: 2018-04-04
Attending: OBSTETRICS & GYNECOLOGY
Payer: MEDICAID

## 2018-04-05 ENCOUNTER — ROUTINE PRENATAL (OUTPATIENT)
Dept: OBGYN | Facility: CLINIC | Age: 28
End: 2018-04-05
Payer: MEDICAID

## 2018-04-05 VITALS — WEIGHT: 253 LBS | SYSTOLIC BLOOD PRESSURE: 110 MMHG | BODY MASS INDEX: 39.63 KG/M2 | DIASTOLIC BLOOD PRESSURE: 70 MMHG

## 2018-04-05 DIAGNOSIS — Z98.891 HISTORY OF C-SECTION: ICD-10-CM

## 2018-04-05 DIAGNOSIS — R87.622 LGSIL PAP SMEAR OF VAGINA: ICD-10-CM

## 2018-04-05 DIAGNOSIS — O09.891 HISTORY OF PRETERM DELIVERY, CURRENTLY PREGNANT IN FIRST TRIMESTER: ICD-10-CM

## 2018-04-05 DIAGNOSIS — O09.93 SUPERVISION OF HIGH RISK PREGNANCY IN THIRD TRIMESTER: ICD-10-CM

## 2018-04-05 PROCEDURE — 90040 PR PRENATAL FOLLOW UP: CPT | Performed by: NURSE PRACTITIONER

## 2018-04-05 NOTE — PROGRESS NOTES
Ob f/u. + fetal movement good  No VB, LOF or contractions   C/O pelvic pressure   Phone number # [phone]  Pharmacy verified with patient  WT=  253 lbs            US=382/70

## 2018-04-05 NOTE — PROGRESS NOTES
SUBJECTIVE:  Pt is a 27 y.o.   at 38w2d  gestation. Presents today for follow-up prenatal care. Reports no issues at this time.  Reports good fetal movement. Denies cramping/contractions, bleeding or leaking of fluid. Denies dysuria, headaches, N/V, or other issues at this time. Generally feels well today. Anxious about surgery, needles, anaesthesia etc.     OBJECTIVE:  - See prenatal vitals flow  Vitals:    18 1113   BP: 110/70   Weight: 114.8 kg (253 lb)                 ASSESSMENT:   - IUP at 38w2d by 8 week US   - S=D   -   Patient Active Problem List    Diagnosis Date Noted   • History of - desires repeat with BTL 10/06/2017     Priority: High   • LGSIL Pap smear of vagina 10/10/2017     Priority: Medium   • History of PTD at 28wk - C/S due to NRFHT 10/06/2017     Priority: Medium   • Supervision of high risk pregnancy in third trimester 10/06/2017     Priority: Medium         PLAN:  - S/sx pregnancy and labor warning signs vs general discomforts discussed  - Fetal movements and kick counts reviewed   - Adequate hydration reinforced  - Nutrition/exercise/vitamin education; continued PNV  - S/p TDAP vacc  - S/p Flu vacc  - Anticipatory guidance given  - RTC for pre op 4/10 and c/s on

## 2018-04-11 ENCOUNTER — HOSPITAL ENCOUNTER (INPATIENT)
Facility: MEDICAL CENTER | Age: 28
LOS: 3 days | End: 2018-04-14
Attending: OBSTETRICS & GYNECOLOGY | Admitting: OBSTETRICS & GYNECOLOGY
Payer: MEDICAID

## 2018-04-11 DIAGNOSIS — G89.18 POST-OP PAIN: Primary | ICD-10-CM

## 2018-04-11 LAB
BASOPHILS # BLD AUTO: 0.3 % (ref 0–1.8)
BASOPHILS # BLD: 0.03 K/UL (ref 0–0.12)
EOSINOPHIL # BLD AUTO: 0.07 K/UL (ref 0–0.51)
EOSINOPHIL NFR BLD: 0.7 % (ref 0–6.9)
ERYTHROCYTE [DISTWIDTH] IN BLOOD BY AUTOMATED COUNT: 44.6 FL (ref 35.9–50)
HCT VFR BLD AUTO: 38.4 % (ref 37–47)
HGB BLD-MCNC: 12.7 G/DL (ref 12–16)
HOLDING TUBE BB 8507: NORMAL
IMM GRANULOCYTES # BLD AUTO: 0.05 K/UL (ref 0–0.11)
IMM GRANULOCYTES NFR BLD AUTO: 0.5 % (ref 0–0.9)
LYMPHOCYTES # BLD AUTO: 2.44 K/UL (ref 1–4.8)
LYMPHOCYTES NFR BLD: 23.6 % (ref 22–41)
MCH RBC QN AUTO: 27.1 PG (ref 27–33)
MCHC RBC AUTO-ENTMCNC: 33.1 G/DL (ref 33.6–35)
MCV RBC AUTO: 82.1 FL (ref 81.4–97.8)
MONOCYTES # BLD AUTO: 0.55 K/UL (ref 0–0.85)
MONOCYTES NFR BLD AUTO: 5.3 % (ref 0–13.4)
NEUTROPHILS # BLD AUTO: 7.22 K/UL (ref 2–7.15)
NEUTROPHILS NFR BLD: 69.6 % (ref 44–72)
NRBC # BLD AUTO: 0 K/UL
NRBC BLD-RTO: 0 /100 WBC
PLATELET # BLD AUTO: 303 K/UL (ref 164–446)
PMV BLD AUTO: 11 FL (ref 9–12.9)
RBC # BLD AUTO: 4.68 M/UL (ref 4.2–5.4)
WBC # BLD AUTO: 10.4 K/UL (ref 4.8–10.8)

## 2018-04-11 PROCEDURE — 700112 HCHG RX REV CODE 229: Performed by: OBSTETRICS & GYNECOLOGY

## 2018-04-11 PROCEDURE — 306828 HCHG ANES-TIME GENERAL: Performed by: OBSTETRICS & GYNECOLOGY

## 2018-04-11 PROCEDURE — A9270 NON-COVERED ITEM OR SERVICE: HCPCS | Performed by: ANESTHESIOLOGY

## 2018-04-11 PROCEDURE — 700105 HCHG RX REV CODE 258: Performed by: ANESTHESIOLOGY

## 2018-04-11 PROCEDURE — 700111 HCHG RX REV CODE 636 W/ 250 OVERRIDE (IP)

## 2018-04-11 PROCEDURE — 770002 HCHG ROOM/CARE - OB PRIVATE (112)

## 2018-04-11 PROCEDURE — 304964 HCHG RECOVERY ROOM TIME 1HR: Performed by: OBSTETRICS & GYNECOLOGY

## 2018-04-11 PROCEDURE — 59514 CESAREAN DELIVERY ONLY: CPT

## 2018-04-11 PROCEDURE — 700101 HCHG RX REV CODE 250

## 2018-04-11 PROCEDURE — 700102 HCHG RX REV CODE 250 W/ 637 OVERRIDE(OP): Performed by: OBSTETRICS & GYNECOLOGY

## 2018-04-11 PROCEDURE — 700111 HCHG RX REV CODE 636 W/ 250 OVERRIDE (IP): Performed by: ANESTHESIOLOGY

## 2018-04-11 PROCEDURE — 88302 TISSUE EXAM BY PATHOLOGIST: CPT | Mod: 59

## 2018-04-11 PROCEDURE — A9270 NON-COVERED ITEM OR SERVICE: HCPCS | Performed by: OBSTETRICS & GYNECOLOGY

## 2018-04-11 PROCEDURE — 700102 HCHG RX REV CODE 250 W/ 637 OVERRIDE(OP): Performed by: ANESTHESIOLOGY

## 2018-04-11 PROCEDURE — 305385 HCHG SURGICAL SERVICES 1/4 HOUR: Performed by: OBSTETRICS & GYNECOLOGY

## 2018-04-11 PROCEDURE — 85025 COMPLETE CBC W/AUTO DIFF WBC: CPT

## 2018-04-11 PROCEDURE — 0UT70ZZ RESECTION OF BILATERAL FALLOPIAN TUBES, OPEN APPROACH: ICD-10-PCS | Performed by: OBSTETRICS & GYNECOLOGY

## 2018-04-11 PROCEDURE — 304966 HCHG RECOVERY SVSC TIME ADDL 1/2 HR: Performed by: OBSTETRICS & GYNECOLOGY

## 2018-04-11 PROCEDURE — 36415 COLL VENOUS BLD VENIPUNCTURE: CPT

## 2018-04-11 PROCEDURE — 306288 HCHG RETRACTOR C SECTION LG

## 2018-04-11 RX ORDER — ONDANSETRON 2 MG/ML
4 INJECTION INTRAMUSCULAR; INTRAVENOUS EVERY 6 HOURS PRN
Status: DISCONTINUED | OUTPATIENT
Start: 2018-04-11 | End: 2018-04-12

## 2018-04-11 RX ORDER — SIMETHICONE 80 MG
80 TABLET,CHEWABLE ORAL 4 TIMES DAILY PRN
Status: DISCONTINUED | OUTPATIENT
Start: 2018-04-11 | End: 2018-04-14 | Stop reason: HOSPADM

## 2018-04-11 RX ORDER — SODIUM CHLORIDE, SODIUM LACTATE, POTASSIUM CHLORIDE, CALCIUM CHLORIDE 600; 310; 30; 20 MG/100ML; MG/100ML; MG/100ML; MG/100ML
INJECTION, SOLUTION INTRAVENOUS PRN
Status: DISCONTINUED | OUTPATIENT
Start: 2018-04-11 | End: 2018-04-14 | Stop reason: HOSPADM

## 2018-04-11 RX ORDER — OXYCODONE HYDROCHLORIDE AND ACETAMINOPHEN 5; 325 MG/1; MG/1
1 TABLET ORAL EVERY 4 HOURS PRN
Status: DISPENSED | OUTPATIENT
Start: 2018-04-11 | End: 2018-04-12

## 2018-04-11 RX ORDER — SODIUM CHLORIDE, SODIUM LACTATE, POTASSIUM CHLORIDE, CALCIUM CHLORIDE 600; 310; 30; 20 MG/100ML; MG/100ML; MG/100ML; MG/100ML
INJECTION, SOLUTION INTRAVENOUS CONTINUOUS
Status: DISCONTINUED | OUTPATIENT
Start: 2018-04-11 | End: 2018-04-11 | Stop reason: HOSPADM

## 2018-04-11 RX ORDER — SODIUM CHLORIDE, SODIUM LACTATE, POTASSIUM CHLORIDE, CALCIUM CHLORIDE 600; 310; 30; 20 MG/100ML; MG/100ML; MG/100ML; MG/100ML
1500 INJECTION, SOLUTION INTRAVENOUS ONCE
Status: COMPLETED | OUTPATIENT
Start: 2018-04-11 | End: 2018-04-11

## 2018-04-11 RX ORDER — MISOPROSTOL 200 UG/1
800 TABLET ORAL
Status: DISCONTINUED | OUTPATIENT
Start: 2018-04-11 | End: 2018-04-11 | Stop reason: HOSPADM

## 2018-04-11 RX ORDER — CEFAZOLIN SODIUM 1 G/3ML
1 INJECTION, POWDER, FOR SOLUTION INTRAMUSCULAR; INTRAVENOUS ONCE
Status: DISCONTINUED | OUTPATIENT
Start: 2018-04-11 | End: 2018-04-11 | Stop reason: HOSPADM

## 2018-04-11 RX ORDER — CITRIC ACID/SODIUM CITRATE 334-500MG
30 SOLUTION, ORAL ORAL ONCE
Status: COMPLETED | OUTPATIENT
Start: 2018-04-11 | End: 2018-04-11

## 2018-04-11 RX ORDER — NALOXONE HYDROCHLORIDE 0.4 MG/ML
0.1 INJECTION, SOLUTION INTRAMUSCULAR; INTRAVENOUS; SUBCUTANEOUS PRN
Status: DISCONTINUED | OUTPATIENT
Start: 2018-04-11 | End: 2018-04-12

## 2018-04-11 RX ORDER — MISOPROSTOL 200 UG/1
600 TABLET ORAL
Status: DISCONTINUED | OUTPATIENT
Start: 2018-04-11 | End: 2018-04-14 | Stop reason: HOSPADM

## 2018-04-11 RX ORDER — HYDROMORPHONE HYDROCHLORIDE 2 MG/ML
0.2 INJECTION, SOLUTION INTRAMUSCULAR; INTRAVENOUS; SUBCUTANEOUS
Status: ACTIVE | OUTPATIENT
Start: 2018-04-11 | End: 2018-04-12

## 2018-04-11 RX ORDER — METOCLOPRAMIDE HYDROCHLORIDE 5 MG/ML
10 INJECTION INTRAMUSCULAR; INTRAVENOUS ONCE
Status: COMPLETED | OUTPATIENT
Start: 2018-04-11 | End: 2018-04-11

## 2018-04-11 RX ORDER — VITAMIN A ACETATE, BETA CAROTENE, ASCORBIC ACID, CHOLECALCIFEROL, .ALPHA.-TOCOPHEROL ACETATE, DL-, THIAMINE MONONITRATE, RIBOFLAVIN, NIACINAMIDE, PYRIDOXINE HYDROCHLORIDE, FOLIC ACID, CYANOCOBALAMIN, CALCIUM CARBONATE, FERROUS FUMARATE, ZINC OXIDE, CUPRIC OXIDE 3080; 12; 120; 400; 1; 1.84; 3; 20; 22; 920; 25; 200; 27; 10; 2 [IU]/1; UG/1; MG/1; [IU]/1; MG/1; MG/1; MG/1; MG/1; MG/1; [IU]/1; MG/1; MG/1; MG/1; MG/1; MG/1
1 TABLET, FILM COATED ORAL EVERY MORNING
Status: DISCONTINUED | OUTPATIENT
Start: 2018-04-11 | End: 2018-04-14 | Stop reason: HOSPADM

## 2018-04-11 RX ORDER — KETOROLAC TROMETHAMINE 30 MG/ML
30 INJECTION, SOLUTION INTRAMUSCULAR; INTRAVENOUS EVERY 6 HOURS
Status: CANCELLED | OUTPATIENT
Start: 2018-04-11 | End: 2018-04-12

## 2018-04-11 RX ORDER — DIPHENHYDRAMINE HYDROCHLORIDE 50 MG/ML
12.5 INJECTION INTRAMUSCULAR; INTRAVENOUS EVERY 6 HOURS PRN
Status: DISCONTINUED | OUTPATIENT
Start: 2018-04-11 | End: 2018-04-12

## 2018-04-11 RX ORDER — KETOROLAC TROMETHAMINE 30 MG/ML
30 INJECTION, SOLUTION INTRAMUSCULAR; INTRAVENOUS EVERY 6 HOURS
Status: DISPENSED | OUTPATIENT
Start: 2018-04-11 | End: 2018-04-12

## 2018-04-11 RX ORDER — DOCUSATE SODIUM 100 MG/1
100 CAPSULE, LIQUID FILLED ORAL 2 TIMES DAILY
Status: DISCONTINUED | OUTPATIENT
Start: 2018-04-11 | End: 2018-04-14 | Stop reason: HOSPADM

## 2018-04-11 RX ADMIN — DOCUSATE SODIUM 100 MG: 100 CAPSULE ORAL at 13:32

## 2018-04-11 RX ADMIN — OXYCODONE HYDROCHLORIDE AND ACETAMINOPHEN 1 TABLET: 5; 325 TABLET ORAL at 17:41

## 2018-04-11 RX ADMIN — SODIUM CITRATE AND CITRIC ACID MONOHYDRATE 30 ML: 500; 334 SOLUTION ORAL at 09:09

## 2018-04-11 RX ADMIN — FAMOTIDINE 20 MG: 10 INJECTION INTRAVENOUS at 09:09

## 2018-04-11 RX ADMIN — METOCLOPRAMIDE 10 MG: 5 INJECTION, SOLUTION INTRAMUSCULAR; INTRAVENOUS at 09:08

## 2018-04-11 RX ADMIN — SODIUM CHLORIDE, POTASSIUM CHLORIDE, SODIUM LACTATE AND CALCIUM CHLORIDE 1500 ML: 600; 310; 30; 20 INJECTION, SOLUTION INTRAVENOUS at 08:14

## 2018-04-11 RX ADMIN — Medication 20 UNITS: at 11:50

## 2018-04-11 RX ADMIN — KETOROLAC TROMETHAMINE 30 MG: 30 INJECTION, SOLUTION INTRAMUSCULAR at 17:32

## 2018-04-11 RX ADMIN — DOCUSATE SODIUM 100 MG: 100 CAPSULE ORAL at 23:09

## 2018-04-11 RX ADMIN — KETOROLAC TROMETHAMINE 30 MG: 30 INJECTION, SOLUTION INTRAMUSCULAR at 23:09

## 2018-04-11 RX ADMIN — Medication 1 TABLET: at 13:00

## 2018-04-11 RX ADMIN — OXYCODONE HYDROCHLORIDE AND ACETAMINOPHEN 1 TABLET: 5; 325 TABLET ORAL at 13:32

## 2018-04-11 ASSESSMENT — LIFESTYLE VARIABLES
EVER_SMOKED: YES
DO YOU DRINK ALCOHOL: NO
EVER_SMOKED: NEVER
ALCOHOL_USE: NO

## 2018-04-11 ASSESSMENT — PAIN SCALES - GENERAL
PAINLEVEL_OUTOF10: 0
PAINLEVEL_OUTOF10: 5
PAINLEVEL_OUTOF10: 5
PAINLEVEL_OUTOF10: 0
PAINLEVEL_OUTOF10: 6

## 2018-04-11 NOTE — H&P
History and Physical    Marie Gan is a 27 y.o. female  at 39w1d who presents for scheduled repeat  and BTL.    Subjective:   CTXs: negative   Pain: negative  LOF: negative  Vaginal bleeding: negative   Fetal movement: positive    ROS: Pertinent positives documented in HPI and all other systems reviewed & are negative    OB History    Para Term  AB Living   3 1   1 1 1   SAB TAB Ectopic Molar Multiple Live Births     1       1      # Outcome Date GA Lbr Jarad/2nd Weight Sex Delivery Anes PTL Lv   3 Current            2  10/05/16 28w0d  1.009 kg (2 lb 3.6 oz) M CS-LTranv Spinal Y CARRIE      Birth Comments: Umbilical cord was wrapped around baby's neck.    1 TAB  6w0d                 PGYNHx: hx abnl pap (LGSIL) with colpo done during pregnancy    History reviewed. No pertinent past medical history.    Past Surgical History:   Procedure Laterality Date   • PRIMARY C SECTION  10/5/2016    Procedure: PRIMARY C SECTION;  Surgeon: Margarette Arriaga M.D.;  Location: LABOR AND DELIVERY;  Service:          Current Facility-Administered Medications:   •  oxytocin (PITOCIN) 20 UNITS/1000ML LR, , , ,   •  Sod Citrate-Citric Acid (BICITRA) 500-334 MG/5ML solution 30 mL, 30 mL, Oral, Once, Jessica Ham M.D.  •  famotidine (PEPCID) injection 20 mg, 20 mg, Intravenous, Once, Jessica Ham M.D.  •  metoclopramide (REGLAN) injection 10 mg, 10 mg, Intravenous, Once, Jessica Ham M.D.    Allergies: Patient has no known allergies.    Social History     Social History   • Marital status:      Spouse name: N/A   • Number of children: N/A   • Years of education: N/A     Occupational History   • Not on file.     Social History Main Topics   • Smoking status: Former Smoker     Packs/day: 0.25     Years: 3.00     Types: Cigarettes     Quit date: 2016   • Smokeless tobacco: Never Used   • Alcohol use No   • Drug use: No   • Sexual activity: Yes     Partners: Male     Birth  "control/ protection: Pill     Other Topics Concern   • Not on file     Social History Narrative    ** Merged History Encounter **            FamHx: no birth defects or chromosomal abnormalities    Prenatal care with [facility] with following problems:  Patient Active Problem List    Diagnosis Date Noted   • History of - desires repeat with BTL 10/06/2017     Priority: High   • LGSIL Pap smear of vagina 10/10/2017     Priority: Medium   • History of PTD at 28wk - C/S due to NRFHT 10/06/2017     Priority: Medium   • Supervision of high risk pregnancy in third trimester 10/06/2017     Priority: Medium         Objective:      Blood pressure 124/79, pulse (!) 113, temperature 36.8 °C (98.2 °F), height 1.702 m (5' 7\"), weight 114.8 kg (253 lb), not currently breastfeeding.    General:   no acute distress, alert, cooperative   Skin:   no rash or abnormalities   HEENT:  PERRLA, EOMI, Sclera clear, anicteric, Oropharynx clear, no lesions and Neck supple with midline trachea   Lungs:   CTA bilateral   Heart:   chest is clear without rales or wheezing, no pedal edema, S1, S2 normal, no murmur, regular rate and rhythm   Abdomen:   soft, gravid, NT   EFW:  7lbs   Pelvis:  adequate with gynecoid pelvis   FHT:  135 BPM  Accels present  Decels absent  Variability moderate  Category 1   Uterine Size: S=D   Presentations: Cephalic   Cervix:     Dilation: Deferred exam    Effacement:     Station:      Consistency:     Position:      Lab Review  Lab:   Blood type: A     Recent Results (from the past 5880 hour(s))   POCT Pregnancy    Collection Time: 17 10:26 AM   Result Value Ref Range    POC Urine Pregnancy Test positive Negative    Internal Control Positive Positive     Internal Control Negative Negative    POCT US - In Clinic OB Scan    Collection Time: 17  8:52 AM   Result Value Ref Range    In Clinic OB Scan     POCT Urinalysis    Collection Time: 10/06/17  8:10 AM   Result Value Ref Range    POC Color  Negative    " POC Appearance  Negative    POC Leukocyte Esterase trace Negative    POC Nitrites negative Negative    POC Urobiligen  Negative (0.2) mg/dL    POC Protein trace Negative mg/dL    POC Urine PH 6.0 5.0 - 8.0    POC Blood trace Negative    POC Specific Gravity 1.015 <1.005 - >1.030    POC Ketones negative Negative mg/dL    POC Bilirubin  Negative mg/dL    POC Glucose negative Negative mg/dL   THINPREP RFLX HPV ASCUS W/CTNG    Collection Time: 10/06/17  9:43 AM   Result Value Ref Range    Cytology Reg See Path Report     Source Cervical     C. trachomatis by PCR Negative Negative    N. gonorrhoeae by PCR Negative Negative   URINE CULTURE(NEW)    Collection Time: 11/14/17 10:03 AM   Result Value Ref Range    Significant Indicator POS     Source UR     Urine Culture Mixed skin tamiko <10,000 cfu/mL (A)     Urine Culture Probable Gardnerella vaginalis  >100,000 cfu/mL   (A)    PREG CNTR PRENATAL PN    Collection Time: 11/14/17 10:04 AM   Result Value Ref Range    WBC 9.1 4.8 - 10.8 K/uL    RBC 4.87 4.20 - 5.40 M/uL    Hemoglobin 12.9 12.0 - 16.0 g/dL    Hematocrit 39.4 37.0 - 47.0 %    MCV 80.9 (L) 81.4 - 97.8 fL    MCH 26.5 (L) 27.0 - 33.0 pg    MCHC 32.7 (L) 33.6 - 35.0 g/dL    RDW 46.8 35.9 - 50.0 fL    Platelet Count 233 164 - 446 K/uL    MPV 11.4 9.0 - 12.9 fL    Neutrophils-Polys 63.10 44.00 - 72.00 %    Lymphocytes 29.90 22.00 - 41.00 %    Monocytes 5.80 0.00 - 13.40 %    Eosinophils 0.60 0.00 - 6.90 %    Basophils 0.30 0.00 - 1.80 %    Immature Granulocytes 0.30 0.00 - 0.90 %    Nucleated RBC 0.00 /100 WBC    Neutrophils (Absolute) 5.71 2.00 - 7.15 K/uL    Lymphs (Absolute) 2.71 1.00 - 4.80 K/uL    Monos (Absolute) 0.53 0.00 - 0.85 K/uL    Eos (Absolute) 0.05 0.00 - 0.51 K/uL    Baso (Absolute) 0.03 0.00 - 0.12 K/uL    Immature Granulocytes (abs) 0.03 0.00 - 0.11 K/uL    NRBC (Absolute) 0.00 K/uL    Color DK Yellow     Character Clear     Specific Gravity 1.026 <1.035    Ph 6.0 5.0 - 8.0    Glucose Negative Negative  mg/dL    Ketones Negative Negative mg/dL    Protein Negative Negative mg/dL    Bilirubin Negative Negative    Urobilinogen, Urine 0.2 Negative    Nitrite Negative Negative    Leukocyte Esterase Trace (A) Negative    Occult Blood Negative Negative    Micro Urine Req Microscopic     Culture Indicated Yes UA Culture    Rubella IgG Antibody 14.90 IU/mL    Syphilis, Treponemal Qual Non Reactive Non Reactive    Hepatitis B Surface Antigen Negative Negative   AFP TETRA    Collection Time: 11/14/17 10:04 AM   Result Value Ref Range    AFP Value -Eia 22 ng/mL    AFP MOM Value 0.73     Hcg Value 13,703 IU/L    Hcg Mom 0.80     Ue3 Value 1.32 ng/mL    Ue3 Mom 1.11     Interpretation Normal     M[age] EDITA 26[age]3 yr    Gestational Age Based On US     Gestational Age 18.00 weeks    Insulin Dependent Diabetes No     Race White     Multiple Pregnancy One     Veronica Value -Eia 163 pg/mL    Veronica Mom Value 1.28     Maternal Weight 245 lbs    Err Maternal Scrn AFP See Note    HIV ANTIBODIES    Collection Time: 11/14/17 10:04 AM   Result Value Ref Range    HIV Ag/Ab Combo Assay Non Reactive Non Reactive   OP PRENATAL PANEL-BLOOD BANK    Collection Time: 11/14/17 10:04 AM   Result Value Ref Range    ABO Grouping Only A     Rh Grouping Only POS     Antibody Screen Scrn NEG    URINE MICROSCOPIC (W/UA)    Collection Time: 11/14/17 10:04 AM   Result Value Ref Range    WBC 2-5 /hpf    RBC 2-5 (A) /hpf    Bacteria Few (A) None /hpf    Epithelial Cells Few /hpf    Hyaline Cast 0-2 /lpf   POCT Urinalysis    Collection Time: 12/01/17 11:00 AM   Result Value Ref Range    POC Color  Negative    POC Appearance  Negative    POC Leukocyte Esterase trace Negative    POC Nitrites negative Negative    POC Urobiligen  Negative (0.2) mg/dL    POC Protein trace Negative mg/dL    POC Urine PH 5.0 5.0 - 8.0    POC Blood negative Negative    POC Specific Gravity 1.015 <1.005 - >1.030    POC Ketones negative Negative mg/dL    POC Bilirubin  Negative mg/dL     POC Glucose negative Negative mg/dL   URINE CULTURE    Collection Time: 12/20/17  1:24 PM   Result Value Ref Range    Significant Indicator NEG     Source UR     Site      Urine Culture Mixed skin tamiko 10-50,000 cfu/mL    GLUCOSE 1HR GESTATIONAL    Collection Time: 01/24/18  9:41 AM   Result Value Ref Range    Glucose, Post Dose 125 70 - 139 mg/dL   HCT    Collection Time: 01/24/18  9:41 AM   Result Value Ref Range    Hematocrit 38.9 37.0 - 47.0 %   HGB    Collection Time: 01/24/18  9:41 AM   Result Value Ref Range    Hemoglobin 12.4 12.0 - 16.0 g/dL   T.PALLIDUM AB EIA    Collection Time: 01/24/18  9:41 AM   Result Value Ref Range    Syphilis, Treponemal Qual Non Reactive Non Reactive   GRP B STREP, BY PCR (ALBERT BROTH)    Collection Time: 03/13/18  4:16 PM   Result Value Ref Range    Strep Gp B DNA PCR Negative Negative   CBC WITH DIFFERENTIAL    Collection Time: 04/11/18  7:54 AM   Result Value Ref Range    WBC 10.4 4.8 - 10.8 K/uL    RBC 4.68 4.20 - 5.40 M/uL    Hemoglobin 12.7 12.0 - 16.0 g/dL    Hematocrit 38.4 37.0 - 47.0 %    MCV 82.1 81.4 - 97.8 fL    MCH 27.1 27.0 - 33.0 pg    MCHC 33.1 (L) 33.6 - 35.0 g/dL    RDW 44.6 35.9 - 50.0 fL    Platelet Count 303 164 - 446 K/uL    MPV 11.0 9.0 - 12.9 fL    Neutrophils-Polys 69.60 44.00 - 72.00 %    Lymphocytes 23.60 22.00 - 41.00 %    Monocytes 5.30 0.00 - 13.40 %    Eosinophils 0.70 0.00 - 6.90 %    Basophils 0.30 0.00 - 1.80 %    Immature Granulocytes 0.50 0.00 - 0.90 %    Nucleated RBC 0.00 /100 WBC    Neutrophils (Absolute) 7.22 (H) 2.00 - 7.15 K/uL    Lymphs (Absolute) 2.44 1.00 - 4.80 K/uL    Monos (Absolute) 0.55 0.00 - 0.85 K/uL    Eos (Absolute) 0.07 0.00 - 0.51 K/uL    Baso (Absolute) 0.03 0.00 - 0.12 K/uL    Immature Granulocytes (abs) 0.05 0.00 - 0.11 K/uL    NRBC (Absolute) 0.00 K/uL   HOLD BLOOD BANK SPECIMEN (NOT TESTED)    Collection Time: 04/11/18  7:54 AM   Result Value Ref Range    Holding Tube - Bb DONE         Assessment:   Marie Gan  at 39w1d  Labor status: Not in labor.  Prior  x 1 - desires repeat  Desires sterilization    Obstetrical history significant for   Patient Active Problem List    Diagnosis Date Noted   • History of - desires repeat with BTL 10/06/2017     Priority: High   • LGSIL Pap smear of vagina 10/10/2017     Priority: Medium   • History of PTD at 28wk - C/S due to NRFHT 10/06/2017     Priority: Medium   • Supervision of high risk pregnancy in third trimester 10/06/2017     Priority: Medium   .      Plan:     Admit to pre-op  GBS negative  NPO  Anesthesia to see  Consent and prep for surgery  Agrees to transfusion if needed    Discussed with the patient the risks of  delivery. The risks include pain, infection, bleeding, scarring, damage to other organs in the area of operation. Specifically organs that can be damaged are bowel, bladder, ureters. I also discussed with the patient the risk of wound infection and wound breakdown. We discussed that these risks are greater in people that have diabetes or obesity. I also discussed the risk of emergency blood transfusion during procedure as well as emergency hysterectomy during procedure.    Patient had the opportunity to ask questions regarding procedures. All questions answered to the patient's satisfaction.    Counseled patient on the risks, benefits, and alternatives to tubal ligation surgery. Pt aware of risk for pain, infection, bleeding, transfusion, injury to adjacent organs, anesthesia complications and death with surgery in general. Pt aware BTL is permanent surgical sterilization.  Failure rate is approx 1% or lower with increased risk of ectopic pregnancy if tubal fails. Pt aware of reversible contraceptive methods available to her including OCPs, depo, ring, patch, IUD, nexplanon, condoms. She declines these methods.  Pt has completed child bearing and desires tubal ligation surgery.

## 2018-04-11 NOTE — CARE PLAN
Problem: Altered physiologic condition related to postoperative  delivery  Goal: Patient physiologically stable as evidenced by normal lochia, palpable uterine involution and vital signs within normal limits  Outcome: PROGRESSING AS EXPECTED  Assessment done, fundus firm, light lochia.Fundal massage done..

## 2018-04-11 NOTE — OR SURGEON
Immediate Post OP Note    PreOp Diagnosis: 39.1wks, prior  section x 1, desires sterilization    PostOp Diagnosis: same    Procedure(s):  REPEAT C SECTION WITH TUBAL LIGATION - Wound Class: Clean Contaminated    Surgeon(s):  Chelsea High M.D.    Assist: Annel Bell    Anesthesiologist/Type of Anesthesia:  Anesthesiologist: Jessica Ham M.D./Spinal    Surgical Staff:  Circulator: Kandi Bowles R.N.  Scrub Person: Jeri Bowles  L&SISI Baby  Nurse: Christie Marcos R.N.    Specimens removed if any:  Bilateral tubal segments    Estimated Blood Loss: 200mL    Findings: viable female infant, vertex/OP, weight 2740gm, apgars 8/9, 3v cord, normal uterus/tubes/ovaries bilaterally    Complications: none        2018 11:19 AM Chelsea High M.D.

## 2018-04-11 NOTE — PROGRESS NOTES
0725 pt arrived to L&D for scheduled Repeat  Section. Pt taken to LDA5, IV started, labs drawn and sent, admission profile completed, pt' prepped for surgery, POC discussed, all questions answered.  0910 Arabella Royal Zurflu at bedside to consent pt. All questions answered.  0925 pt back to OR 2 in stable condition.  1020 viable baby girl born via Dr. García. APGARs 8/9.  1022 placenta delivered, intact.  1102 out of OR 2 to PACU, in stable condition.  1204 pt out of PACU and transferred to PPU with baby in arms, both in stable condition-report given to PPU RN-assumed care, POC discussed, VSS.

## 2018-04-11 NOTE — CARE PLAN
Problem: Risk for Infection, Impaired Wound Healing  Goal: Remain free from signs and symptoms of infection  Outcome: PROGRESSING AS EXPECTED  Pt remains afebrile with no other s/s of infection    Problem: Risk for Deep Vein Thrombosis/Venous Thromboembolism  Goal: DVT/VTE Prevention Measures in Place  Outcome: PROGRESSING AS EXPECTED  Pt has SCD's in place, DVT education provided.

## 2018-04-11 NOTE — OP REPORT
DATE OF SERVICE:  18     PREOPERATIVE DIAGNOSES:  1.  Intrauterine pregnancy at 39w1d  2.  Prior  x 1 - desires repeat  3.  Desires sterilization     POSTOPERATIVE DIAGNOSES:  1.  Intrauterine pregnancy at 39w1d  2.  same    PROCEDURE PERFORMED:  Repeat low transverse  section. BTL via Carrier method     SURGEON:  Chelsea García MD     ASSISTANT: Annel Bell     ANESTHESIA:  Spinal.     ANESTHESIOLOGIST:  MD Fatoumata     SPECIMEN:  bilateral tubal segments     ESTIMATED BLOOD LOSS:  200 mL     FINDINGS:  A viable female infant, vertex, weight 2740gm, Apgars of 8 and 9 in vertex    presentation with clear amniotic fluid.  Nuchal cord x 1 (loose) and body cord x 1 was present. There was a normal uterus, tubes, and ovaries bilaterally.     COMPLICATIONS:  None.     PROCEDURE:  After appropriate consents were obtained, the patient was taken to the operating room where spinal  anesthesia was applied without complications.  The patient was then prepped and draped in the usual sterile manner.  Clamp test on the skin verified adequate anesthesia.  A Pfannenstiel incision was made with a scalpel 3cm superior to the pubic symphysis and extended down to the rectus fascia.  The rectus fascia was incised with the scalpel and tented up. The underlying rectus muscle was  from the fascia first inferiorly and then superiorly using the mcclure scissors.  The rectus muscle was  bluntly in the midline.  The peritoneum was entered bluntly in the midline. The peritoneum incision was extended superiorly and inferiorly with the Metzenbaum scissors with great care to avoid injury to underlying bowel or bladder.  Giovanni retractor was placed. The vesicouterine peritoneum was tented up and entered with Metzenbaum scissors, and a bladder flap was created.  An incision was made into the lower uterine segment transversely and the incision was extended bluntly.  Amniotomy was performed and there was  noted to be clear amniotic fluid. The Infant's head was grasped and delivered atraumatically followed by the remainder of the body without any complications.  The nuchal and body cord was reduced.  The mouth and nares were suctioned. The cord was doubly clamped and cut and the infant was handed off to awaiting neonatology team.  The placenta was then allowed to spontaneously deliver. The uterus was cleared of clots and debris.  The hysterotomy incision was reapproximated with 1-0 Vicryl suture in a running locked fashion in a single layer.  Hemostasis was noted.  The tubes and ovaries were examined and noted to be normal.  The tubes were followed out to their fimbriated ends.  Bilateral tubal ligation was performed using the [place] Method.  The tubal segments excised were sent for pathologic specimen.  Excellent hemostasis was noted. The pericolic gutters were examined and any blood clots were removed.  The Giovanni retractor was removed. The peritoneum was reapproximated with 3-0 Vicryl suture running.  The rectus muscles were examined and hemostatic.  The fascia was reapproximated with 0 Vicryl suture running.  The subcutaneous fat was irrigated and any small bleeders were bovied for hemostasis.  The subcutaneous fat was then reapproximated with 3-0 Vicryl suture running.  The skin was reapproximated with 4-0 Monocryl suture running. Steri strips and a dressing were placed.  Sponge, needle, instrument, and lap counts were correct x2.  Patient tolerated the procedure well and went to recovery room in stable condition.        ____________________________________     Chelsea García MD

## 2018-04-12 LAB
ERYTHROCYTE [DISTWIDTH] IN BLOOD BY AUTOMATED COUNT: 46.5 FL (ref 35.9–50)
HCT VFR BLD AUTO: 35.8 % (ref 37–47)
HGB BLD-MCNC: 11.5 G/DL (ref 12–16)
MCH RBC QN AUTO: 27 PG (ref 27–33)
MCHC RBC AUTO-ENTMCNC: 32.1 G/DL (ref 33.6–35)
MCV RBC AUTO: 84 FL (ref 81.4–97.8)
PLATELET # BLD AUTO: 232 K/UL (ref 164–446)
PMV BLD AUTO: 11.2 FL (ref 9–12.9)
RBC # BLD AUTO: 4.26 M/UL (ref 4.2–5.4)
WBC # BLD AUTO: 10.5 K/UL (ref 4.8–10.8)

## 2018-04-12 PROCEDURE — A9270 NON-COVERED ITEM OR SERVICE: HCPCS | Performed by: OBSTETRICS & GYNECOLOGY

## 2018-04-12 PROCEDURE — 85027 COMPLETE CBC AUTOMATED: CPT

## 2018-04-12 PROCEDURE — A9270 NON-COVERED ITEM OR SERVICE: HCPCS | Performed by: ANESTHESIOLOGY

## 2018-04-12 PROCEDURE — 700112 HCHG RX REV CODE 229: Performed by: OBSTETRICS & GYNECOLOGY

## 2018-04-12 PROCEDURE — 36415 COLL VENOUS BLD VENIPUNCTURE: CPT

## 2018-04-12 PROCEDURE — 700102 HCHG RX REV CODE 250 W/ 637 OVERRIDE(OP): Performed by: OBSTETRICS & GYNECOLOGY

## 2018-04-12 PROCEDURE — 770002 HCHG ROOM/CARE - OB PRIVATE (112)

## 2018-04-12 PROCEDURE — 700111 HCHG RX REV CODE 636 W/ 250 OVERRIDE (IP): Performed by: ANESTHESIOLOGY

## 2018-04-12 PROCEDURE — 700102 HCHG RX REV CODE 250 W/ 637 OVERRIDE(OP): Performed by: ANESTHESIOLOGY

## 2018-04-12 RX ORDER — ONDANSETRON 2 MG/ML
4 INJECTION INTRAMUSCULAR; INTRAVENOUS EVERY 6 HOURS PRN
Status: DISCONTINUED | OUTPATIENT
Start: 2018-04-12 | End: 2018-04-14 | Stop reason: HOSPADM

## 2018-04-12 RX ORDER — OXYCODONE HYDROCHLORIDE AND ACETAMINOPHEN 5; 325 MG/1; MG/1
1 TABLET ORAL EVERY 4 HOURS PRN
Status: DISCONTINUED | OUTPATIENT
Start: 2018-04-12 | End: 2018-04-14 | Stop reason: HOSPADM

## 2018-04-12 RX ORDER — OXYCODONE AND ACETAMINOPHEN 10; 325 MG/1; MG/1
1 TABLET ORAL EVERY 4 HOURS PRN
Status: DISCONTINUED | OUTPATIENT
Start: 2018-04-12 | End: 2018-04-14 | Stop reason: HOSPADM

## 2018-04-12 RX ORDER — DIPHENHYDRAMINE HYDROCHLORIDE 50 MG/ML
25 INJECTION INTRAMUSCULAR; INTRAVENOUS EVERY 6 HOURS PRN
Status: DISCONTINUED | OUTPATIENT
Start: 2018-04-12 | End: 2018-04-14 | Stop reason: HOSPADM

## 2018-04-12 RX ORDER — IBUPROFEN 800 MG/1
800 TABLET ORAL EVERY 8 HOURS PRN
Status: DISCONTINUED | OUTPATIENT
Start: 2018-04-12 | End: 2018-04-14 | Stop reason: HOSPADM

## 2018-04-12 RX ORDER — DIPHENHYDRAMINE HCL 25 MG
25 TABLET ORAL EVERY 6 HOURS PRN
Status: DISCONTINUED | OUTPATIENT
Start: 2018-04-12 | End: 2018-04-14 | Stop reason: HOSPADM

## 2018-04-12 RX ORDER — ONDANSETRON 4 MG/1
4 TABLET, ORALLY DISINTEGRATING ORAL EVERY 6 HOURS PRN
Status: DISCONTINUED | OUTPATIENT
Start: 2018-04-12 | End: 2018-04-14 | Stop reason: HOSPADM

## 2018-04-12 RX ADMIN — Medication 1 TABLET: at 08:41

## 2018-04-12 RX ADMIN — DOCUSATE SODIUM 100 MG: 100 CAPSULE ORAL at 22:21

## 2018-04-12 RX ADMIN — OXYCODONE HYDROCHLORIDE AND ACETAMINOPHEN 1 TABLET: 10; 325 TABLET ORAL at 20:26

## 2018-04-12 RX ADMIN — IBUPROFEN 800 MG: 800 TABLET, FILM COATED ORAL at 20:27

## 2018-04-12 RX ADMIN — OXYCODONE HYDROCHLORIDE AND ACETAMINOPHEN 1 TABLET: 5; 325 TABLET ORAL at 04:05

## 2018-04-12 RX ADMIN — OXYCODONE HYDROCHLORIDE AND ACETAMINOPHEN 1 TABLET: 5; 325 TABLET ORAL at 14:16

## 2018-04-12 RX ADMIN — KETOROLAC TROMETHAMINE 30 MG: 30 INJECTION, SOLUTION INTRAMUSCULAR at 04:56

## 2018-04-12 RX ADMIN — DOCUSATE SODIUM 100 MG: 100 CAPSULE ORAL at 08:40

## 2018-04-12 ASSESSMENT — PAIN SCALES - GENERAL
PAINLEVEL_OUTOF10: 6
PAINLEVEL_OUTOF10: 0
PAINLEVEL_OUTOF10: 7
PAINLEVEL_OUTOF10: 1
PAINLEVEL_OUTOF10: 2
PAINLEVEL_OUTOF10: 0
PAINLEVEL_OUTOF10: 0
PAINLEVEL_OUTOF10: 4
PAINLEVEL_OUTOF10: 5

## 2018-04-12 NOTE — CARE PLAN
Problem: Potential for postpartum infection related to surgical incision, compromised uterine condition, urinary tract or respiratory compromise  Goal: Patient will be afebrile and free from signs and symptoms of infection  Outcome: PROGRESSING AS EXPECTED  No signs of infection noted.

## 2018-04-12 NOTE — PROGRESS NOTES
Post Partum Progress Note    Name:   Marie Gan   Date/Time:  2018 - 6:31 AM  Chief Admitting Dx:  Pregnancy  PREVIOUS  SECTION, PERMANENT STERILIZATION, 39+1 WEEKS GESTATION  39 weeks gestation of pregnancy  Indication for care in labor or delivery  Hx of  section  Encounter for tubal ligation  Delivery Type:   for repeat  Post-Op/Post Partum Days #:  1    Subjective:  Abdominal pain: no  Ambulating:   yes  Tolerating liquids:  yes  Tolerating food:  yes common adult  Flatus:   yes  BM:    no  Bleeding:   without any bleeding  Voiding:   yes  Dizziness:   no  Feeding:   breast    Vitals:    18 0000 18 0100 18 0200 18 0400   BP: 103/71   121/79   Pulse: 92 84 99 83   Resp:    Temp: 36.6 °C (97.8 °F)   36.8 °C (98.2 °F)   SpO2: 93% 95% 93% 95%   Weight:       Height:           Exam:  Breast: Tenderness no, Engorged no and Lactating yes  Abdomen: Abdomen soft, non-tender. BS normal. No masses,  No organomegaly  Fundal Tenderness:  no  Fundus Firm: yes  Incision: dry and intact  Below umbilicus: yes  Perineum: perineum intact  Lochia: mild  Extremities: Normal extremities, peripheral pulses and reflexes normal    Meds:  Current Facility-Administered Medications   Medication Dose   • oxytocin (PITOCIN) infusion (for postpartum)  2,000 mL/hr    Followed by   • oxytocin (PITOCIN) infusion (for postpartum)   mL/hr   • LR infusion     • PRN oxytocin (PITOCIN) (20 Units/1000 mL) PRN for excessive uterine bleeding - See Admin Instr  125-999 mL/hr   • miSOPROStol (CYTOTEC) tablet 600 mcg  600 mcg   • docusate sodium (COLACE) capsule 100 mg  100 mg   • magnesium hydroxide (MILK OF MAGNESIA) suspension 30 mL  30 mL   • simethicone (MYLICON) chewable tab 80 mg  80 mg   • prenatal plus vitamin (STUARTNATAL 1+1) 27-1 MG tablet 1 Tab  1 Tab   • naloxone (NARCAN) injection 0.1 mg  0.1 mg   • ketorolac (TORADOL) injection 30 mg  30 mg   •  oxyCODONE-acetaminophen (PERCOCET) 5-325 MG per tablet 1 Tab  1 Tab   • HYDROmorphone (DILAUDID) injection 0.2 mg  0.2 mg   • ePHEDrine injection 10 mg  10 mg   • ondansetron (ZOFRAN) syringe/vial injection 4 mg  4 mg   • naloxone (NARCAN) 0.4 mg in NS 1,000 mL infusion  0.4 mg   • diphenhydrAMINE (BENADRYL) injection 12.5 mg  12.5 mg   • naloxone (NARCAN) 0.4 mg in NS 1,000 mL infusion  0.4 mg       Labs:   Recent Labs      18   0754  18   0333   WBC  10.4  10.5   RBC  4.68  4.26   HEMOGLOBIN  12.7  11.5*   HEMATOCRIT  38.4  35.8*   MCV  82.1  84.0   MCH  27.1  27.0   MCHC  33.1*  32.1*   RDW  44.6  46.5   PLATELETCT  303  232   MPV  11.0  11.2       Assessment:  Chief Admitting Dx:  Pregnancy  PREVIOUS  SECTION, PERMANENT STERILIZATION, 39+1 WEEKS GESTATION  39 weeks gestation of pregnancy  Indication for care in labor or delivery  Hx of  section  Encounter for tubal ligation  Delivery Type:   for repeat  Tubal Ligation:  yes    Plan:  Continue routine post partum care.  Patient states has now passed gas and has advanced nutrition. No specific complaints this morning.     SNEHA CordovaP.

## 2018-04-13 PROCEDURE — A9270 NON-COVERED ITEM OR SERVICE: HCPCS | Performed by: OBSTETRICS & GYNECOLOGY

## 2018-04-13 PROCEDURE — 700102 HCHG RX REV CODE 250 W/ 637 OVERRIDE(OP): Performed by: OBSTETRICS & GYNECOLOGY

## 2018-04-13 PROCEDURE — 700112 HCHG RX REV CODE 229: Performed by: OBSTETRICS & GYNECOLOGY

## 2018-04-13 PROCEDURE — 770002 HCHG ROOM/CARE - OB PRIVATE (112)

## 2018-04-13 RX ADMIN — OXYCODONE HYDROCHLORIDE AND ACETAMINOPHEN 1 TABLET: 10; 325 TABLET ORAL at 23:24

## 2018-04-13 RX ADMIN — OXYCODONE HYDROCHLORIDE AND ACETAMINOPHEN 1 TABLET: 10; 325 TABLET ORAL at 07:48

## 2018-04-13 RX ADMIN — DOCUSATE SODIUM 100 MG: 100 CAPSULE ORAL at 07:48

## 2018-04-13 RX ADMIN — OXYCODONE HYDROCHLORIDE AND ACETAMINOPHEN 1 TABLET: 10; 325 TABLET ORAL at 03:14

## 2018-04-13 RX ADMIN — IBUPROFEN 800 MG: 800 TABLET, FILM COATED ORAL at 18:23

## 2018-04-13 RX ADMIN — OXYCODONE HYDROCHLORIDE AND ACETAMINOPHEN 1 TABLET: 10; 325 TABLET ORAL at 18:23

## 2018-04-13 RX ADMIN — OXYCODONE HYDROCHLORIDE AND ACETAMINOPHEN 1 TABLET: 5; 325 TABLET ORAL at 14:20

## 2018-04-13 RX ADMIN — IBUPROFEN 800 MG: 800 TABLET, FILM COATED ORAL at 07:48

## 2018-04-13 RX ADMIN — Medication 1 TABLET: at 07:48

## 2018-04-13 RX ADMIN — DOCUSATE SODIUM 100 MG: 100 CAPSULE ORAL at 23:23

## 2018-04-13 ASSESSMENT — PAIN SCALES - GENERAL
PAINLEVEL_OUTOF10: 6
PAINLEVEL_OUTOF10: 7
PAINLEVEL_OUTOF10: 2
PAINLEVEL_OUTOF10: 7
PAINLEVEL_OUTOF10: 7
PAINLEVEL_OUTOF10: 4
PAINLEVEL_OUTOF10: 2
PAINLEVEL_OUTOF10: 2

## 2018-04-13 NOTE — PROGRESS NOTES
Bedside report done, patient is ambulating well inside the the room. FOB @ bedside. Will continue to monitor.

## 2018-04-13 NOTE — PROGRESS NOTES
Assessment done. Pt.complains of pain at 7  , and patient medicated. Incision approximated with steri-strips, without drainage, swelling or redness.Lochia scant to light. Pt ambulating in room without dizziness or assistance.Pt. Passing flatus, no problems urinating. Fob in room ,supportive.patient has received education on education videos.plan of care for today discussed.

## 2018-04-13 NOTE — PROGRESS NOTES
Post Partum Progress Note    Name:   Marie Gan   Date/Time:  2018 - 7:20 AM  Chief Admitting Dx:  Pregnancy  PREVIOUS  SECTION, PERMANENT STERILIZATION, 39+1 WEEKS GESTATION  39 weeks gestation of pregnancy  Indication for care in labor or delivery  Hx of  section  Encounter for tubal ligation  Delivery Type:   for repeat  Post-Op/Post Partum Days #:  2    Subjective:  Abdominal pain: no  Ambulating:   yes  Tolerating liquids:  yes  Tolerating food:  yes common adult  Flatus:   yes  BM:    no  Bleeding:   with a moderate amount of bleeding  Voiding:   yes  Dizziness:   no  Feeding:   breast    Vitals:    18 0200 18 0400 18 0847 18   BP:  121/79 130/66 121/83   Pulse: 99 83 81 86   Resp:    Temp:  36.8 °C (98.2 °F) 36.4 °C (97.6 °F) 36.3 °C (97.3 °F)   SpO2: 93% 95% 96% 97%   Weight:       Height:           Exam:  Breast: Tenderness no  Abdomen: Abdomen soft, non-tender. BS normal. No masses,  No organomegaly  Fundal Tenderness:  no  Fundus Firm: yes  Incision: dry and intact  Below umbilicus: yes  Perineum: perineum intact  Lochia: moderate  Extremities: Normal extremities, peripheral pulses and reflexes normal, no edema, redness or tenderness in the calves or thighs    Meds:  Current Facility-Administered Medications   Medication Dose   • ibuprofen (MOTRIN) tablet 800 mg  800 mg   • oxyCODONE-acetaminophen (PERCOCET) 5-325 MG per tablet 1 Tab  1 Tab   • oxyCODONE-acetaminophen (PERCOCET-10)  MG per tablet 1 Tab  1 Tab   • ondansetron (ZOFRAN) syringe/vial injection 4 mg  4 mg    Or   • ondansetron (ZOFRAN ODT) dispertab 4 mg  4 mg   • diphenhydrAMINE (BENADRYL) tablet/capsule 25 mg  25 mg    Or   • diphenhydrAMINE (BENADRYL) injection 25 mg  25 mg   • oxytocin (PITOCIN) infusion (for postpartum)   mL/hr   • LR infusion     • PRN oxytocin (PITOCIN) (20 Units/1000 mL) PRN for excessive uterine bleeding - See Admin Instr   125-999 mL/hr   • miSOPROStol (CYTOTEC) tablet 600 mcg  600 mcg   • docusate sodium (COLACE) capsule 100 mg  100 mg   • magnesium hydroxide (MILK OF MAGNESIA) suspension 30 mL  30 mL   • simethicone (MYLICON) chewable tab 80 mg  80 mg   • prenatal plus vitamin (STUARTNATAL 1+1) 27-1 MG tablet 1 Tab  1 Tab       Labs:   Recent Labs      18   0754  18   0333   WBC  10.4  10.5   RBC  4.68  4.26   HEMOGLOBIN  12.7  11.5*   HEMATOCRIT  38.4  35.8*   MCV  82.1  84.0   MCH  27.1  27.0   MCHC  33.1*  32.1*   RDW  44.6  46.5   PLATELETCT  303  232   MPV  11.0  11.2       Assessment:  Chief Admitting Dx:  Pregnancy  PREVIOUS  SECTION, PERMANENT STERILIZATION, 39+1 WEEKS GESTATION  39 weeks gestation of pregnancy  Indication for care in labor or delivery  Hx of  section  Encounter for tubal ligation  Delivery Type:   for repeat  Tubal Ligation:  yes    Plan:  Continue routine post partum care.  Breastfeeding support  Anticipate d/c tomorrow     RAMYA Reddy.

## 2018-04-13 NOTE — CARE PLAN
Problem: Altered physiologic condition related to postoperative  delivery  Goal: Patient physiologically stable as evidenced by normal lochia, palpable uterine involution and vital signs within normal limits  Outcome: PROGRESSING AS EXPECTED  Fundus firm @ U, lochia rubra minimal. Surgical incision with steri-strips CDI. V/S within defined limits.     Problem: Alteration in comfort related to surgical incision and/or after birth pains  Goal: Patient is able to ambulate, care for self and infant with acceptable pain level  Outcome: PROGRESSING AS EXPECTED  Patient is ambulating well and able to care for self and infant.

## 2018-04-13 NOTE — CARE PLAN
Problem: Potential knowledge deficit related to lack of understanding of self and  care  Goal: Patient will demonstrate ability to care for self and infant  Outcome: PROGRESSING AS EXPECTED  Patient demonstrates skills in baby care and self care. Patient able to feed,change diapers and comfort baby. Patient demonstrates good hygiene for self and baby.     Problem: Potential anxiety related to difficulty adapting to parental role  Goal: Patient will verbalize and demonstrate effective bonding and parenting behavior  Outcome: PROGRESSING AS EXPECTED  Patient bonding well with baby.holding and comforting baby when needed. Patient seeking information on baby care and has supportive family or friend to rely on .patient aware of parenting support agencies such as [facility] for infant care assistance and information.

## 2018-04-13 NOTE — CONSULTS
Lactation note:     Per RN request to see couplet. Discussed normal  behaviors and normal course of breastfeeding at 12-24-48-72 hours, and what to expect. Discussed importance of offering breast every 2-3 hours, and even if infant shows no interest, can do hand expression into infant's lips. Encouraged to continue doing skin to skin. Discussed signs of a good latch, voiding and stooling patterns, feeding cues, stomach size, and importance of establishing milk supply with frequency of feedings. Observed MOB attempting to latch infant to left side in football hold.  Infant latched, and MOB reports feels better, and will continue to work on deeper latch. Infant weight down 9% at 22 hours old. MOB did state she had an issue with milk supply with her first infant.    Feeding plan implemented:   1- To breastfeed first, every 3 hours.   2- if latch or breastfeeding is suboptimal, can supplement according to [place] guidelines. (Volumes reviewed per infant birthweight)  3. Use breastpump to help protect and stimulate supply.      MOB has no other questions or concerns regarding breastfeeding. Encouraged to call for support as needed.

## 2018-04-13 NOTE — CONSULTS
Plan: Spend lots of time with baby on mothers chest-skin to skin. Pump with hospital grade electric breast pump every 3 hours, if baby not latching and breastfeeding well. Practice hand expression. Attempt to latch baby whenever baby is hungry, shows feeding cues. If mother or baby too frustrated or tired to attempt latch, skip that but still pump, hand express and spend time skin to skin.Feed baby appropriate amounts of expressed colostrum/milk. Supplement as needed with donor milk or pumped milk ensure infants' caloric needs are met.

## 2018-04-14 VITALS
SYSTOLIC BLOOD PRESSURE: 139 MMHG | RESPIRATION RATE: 20 BRPM | HEIGHT: 67 IN | TEMPERATURE: 97.8 F | WEIGHT: 253 LBS | HEART RATE: 112 BPM | DIASTOLIC BLOOD PRESSURE: 90 MMHG | OXYGEN SATURATION: 97 % | BODY MASS INDEX: 39.71 KG/M2

## 2018-04-14 PROBLEM — G89.18 POST-OP PAIN: Status: ACTIVE | Noted: 2018-04-14

## 2018-04-14 PROCEDURE — 700112 HCHG RX REV CODE 229: Performed by: OBSTETRICS & GYNECOLOGY

## 2018-04-14 PROCEDURE — A9270 NON-COVERED ITEM OR SERVICE: HCPCS | Performed by: OBSTETRICS & GYNECOLOGY

## 2018-04-14 PROCEDURE — 700102 HCHG RX REV CODE 250 W/ 637 OVERRIDE(OP): Performed by: OBSTETRICS & GYNECOLOGY

## 2018-04-14 RX ORDER — IBUPROFEN 800 MG/1
800 TABLET ORAL EVERY 8 HOURS PRN
Qty: 30 TAB | Refills: 2 | Status: SHIPPED | OUTPATIENT
Start: 2018-04-14

## 2018-04-14 RX ORDER — OXYCODONE HYDROCHLORIDE AND ACETAMINOPHEN 5; 325 MG/1; MG/1
1 TABLET ORAL EVERY 4 HOURS PRN
Qty: 30 TAB | Refills: 0 | Status: SHIPPED | OUTPATIENT
Start: 2018-04-14 | End: 2018-04-20

## 2018-04-14 RX ORDER — PSEUDOEPHEDRINE HCL 30 MG
100 TABLET ORAL 2 TIMES DAILY
Qty: 60 CAP | Refills: 2 | Status: SHIPPED | OUTPATIENT
Start: 2018-04-14 | End: 2023-06-16

## 2018-04-14 RX ADMIN — DOCUSATE SODIUM 100 MG: 100 CAPSULE ORAL at 08:16

## 2018-04-14 RX ADMIN — OXYCODONE HYDROCHLORIDE AND ACETAMINOPHEN 1 TABLET: 5; 325 TABLET ORAL at 04:00

## 2018-04-14 RX ADMIN — IBUPROFEN 800 MG: 800 TABLET, FILM COATED ORAL at 04:00

## 2018-04-14 RX ADMIN — OXYCODONE HYDROCHLORIDE AND ACETAMINOPHEN 1 TABLET: 10; 325 TABLET ORAL at 08:16

## 2018-04-14 RX ADMIN — Medication 1 TABLET: at 08:16

## 2018-04-14 ASSESSMENT — PAIN SCALES - GENERAL
PAINLEVEL_OUTOF10: 7
PAINLEVEL_OUTOF10: 7

## 2018-04-14 NOTE — PROGRESS NOTES
Discharge instructions reviewed with patient.  screen, mom and baby f/u appts. reviewed with mom. Car seat present. Birth certificate done. Prescriptions given.Patient informed of discharge survey phone call.Bands matched and security tag removed.Mom and baby d/c'd to home.

## 2018-04-14 NOTE — PROGRESS NOTES
Assessment done. Pt complains of pain at 7and patient medicated. Incision approximated with steri-strips without new drainage, swelling or redness.Lochia scant to light. Pt ambulating in room without dizziness or assistance.Pt. Passing flatus, no problems urinating. Fob in room .patient has received education on skylight usage.plan of care for today discussed.

## 2018-04-14 NOTE — CARE PLAN
Problem: Altered physiologic condition related to postoperative  delivery  Goal: Patient physiologically stable as evidenced by normal lochia, palpable uterine involution and vital signs within normal limits  Outcome: PROGRESSING AS EXPECTED  Fundus firm, lochia light,blood pressure and other vitals stable. Patient able to ambulate without dizziness. Patient intake of fluids wnl. Post delivery hematocrit and hemoglobin  is stable.    Problem: Potential knowledge deficit related to lack of understanding of self and  care  Goal: Patient will verbalize understanding of self and infant care  Outcome: PROGRESSING AS EXPECTED  Patient has reviewed postpartum mom and baby education and verbalizes understanding.patient states if she has any questions she will ask her nurse or physician.

## 2018-04-14 NOTE — DISCHARGE SUMMARY
Discharge Summary:      Marie Gan      Admit Date:   2018  Discharge Date:  2018     Admitting diagnosis:  Pregnancy  PREVIOUS  SECTION, PERMANENT STERILIZATION, 39+1 WEEKS GESTATION  39 weeks gestation of pregnancy  Indication for care in labor or delivery  Hx of  section  Encounter for tubal ligation  Discharge Diagnosis: Status post  for repeat.  Pregnancy Complications: hx of PTD  Tubal Ligation:  yes        History:  History reviewed. No pertinent past medical history.  OB History    Para Term  AB Living   3 1   1 1 1   SAB TAB Ectopic Molar Multiple Live Births     1       1      # Outcome Date GA Lbr Jarad/2nd Weight Sex Delivery Anes PTL Lv   3 Current            2  10/05/16 28w0d  1.009 kg (2 lb 3.6 oz) M CS-LTranv Spinal Y CARRIE      Birth Comments: Umbilical cord was wrapped around baby's neck.    1 TAB  6w0d                  Patient has no known allergies.  Patient Active Problem List    Diagnosis Date Noted   • History of - desires repeat with BTL 10/06/2017     Priority: High   • LGSIL Pap smear of vagina 10/10/2017     Priority: Medium   • History of PTD at 28wk - C/S due to NRFHT 10/06/2017     Priority: Medium   • Supervision of high risk pregnancy in third trimester 10/06/2017     Priority: Medium   • Post-op pain 2018        Hospital Course:   27 y.o. , now para 1, was admitted with the above mentioned diagnosis, underwent Repeat  repeat,  for repeat. Patient postpartum course was unremarkable, with progressive advancement in diet , ambulation and toleration of oral analgesia. Patient without complaints today and desires discharge.      Vitals:    18 0847 18 0800 18   BP: 130/66 121/83 136/87 138/88   Pulse: 81 86 84 (!) 104   Resp:    Temp: 36.4 °C (97.6 °F) 36.3 °C (97.3 °F) 36.5 °C (97.7 °F) 36.7 °C (98.1 °F)   SpO2: 96% 97% 97% 96%   Weight:        Height:           Current Facility-Administered Medications   Medication Dose   • ibuprofen (MOTRIN) tablet 800 mg  800 mg   • oxyCODONE-acetaminophen (PERCOCET) 5-325 MG per tablet 1 Tab  1 Tab   • oxyCODONE-acetaminophen (PERCOCET-10)  MG per tablet 1 Tab  1 Tab   • ondansetron (ZOFRAN) syringe/vial injection 4 mg  4 mg    Or   • ondansetron (ZOFRAN ODT) dispertab 4 mg  4 mg   • diphenhydrAMINE (BENADRYL) tablet/capsule 25 mg  25 mg    Or   • diphenhydrAMINE (BENADRYL) injection 25 mg  25 mg   • oxytocin (PITOCIN) infusion (for postpartum)   mL/hr   • LR infusion     • PRN oxytocin (PITOCIN) (20 Units/1000 mL) PRN for excessive uterine bleeding - See Admin Instr  125-999 mL/hr   • miSOPROStol (CYTOTEC) tablet 600 mcg  600 mcg   • docusate sodium (COLACE) capsule 100 mg  100 mg   • magnesium hydroxide (MILK OF MAGNESIA) suspension 30 mL  30 mL   • simethicone (MYLICON) chewable tab 80 mg  80 mg   • prenatal plus vitamin (STUARTNATAL 1+1) 27-1 MG tablet 1 Tab  1 Tab       Exam:  Breast Exam: negative  Abdomen: Abdomen soft, non-tender. BS normal. No masses,  No organomegaly  Fundus Non Tender: yes  Incision: dry and intact steristrips in place and HAYLEE  Perineum: perineum intact  Extremity: extremities, peripheral pulses and reflexes normal, no edema, redness or tenderness in the calves or thighs     Labs:  Recent Labs      04/11/18   0754  04/12/18   0333   WBC  10.4  10.5   RBC  4.68  4.26   HEMOGLOBIN  12.7  11.5*   HEMATOCRIT  38.4  35.8*   MCV  82.1  84.0   MCH  27.1  27.0   MCHC  33.1*  32.1*   RDW  44.6  46.5   PLATELETCT  303  232   MPV  11.0  11.2        Activity:   Discharge to home  Pelvic Rest x 6 weeks    Assessment:  normal postpartum course  Discharge Assessment: No areas of skin breakdown/redness; surgical incision intact/healing     [facility] or [facility] in 5 weeks for vaginal ; 1 week for incision check.      Discharge Meds:   Current Outpatient Prescriptions    Medication Sig Dispense Refill   • oxyCODONE-acetaminophen (PERCOCET) 5-325 MG Tab Take 1 Tab by mouth every four hours as needed for up to 6 days. 30 Tab 0   • ibuprofen (MOTRIN) 800 MG Tab Take 1 Tab by mouth every 8 hours as needed (For cramping after delivery; do not give if patient is receiving ketorolac (Toradol)). 30 Tab 2   • docusate sodium 100 MG Cap Take 100 mg by mouth 2 Times a Day. 60 Cap 2       Pina Vale C.N.M.

## 2018-04-14 NOTE — PROGRESS NOTES
Bedside report received, assumed care of pt. Assessment complete, VSS, fundus firm, lochia light. Pt voiding without difficulty. Pumping q2-3 hours. Bonding well with infant. Pt states pain is being well controlled and will call for PRN pain meds as needed/Pt would like to receive pain medication on a scheduled basis. POC discussed with pt and family, questions answered, call light within reach.

## 2018-04-14 NOTE — CARE PLAN
Problem: Altered physiologic condition related to postoperative  delivery  Goal: Patient physiologically stable as evidenced by normal lochia, palpable uterine involution and vital signs within normal limits  Outcome: PROGRESSING AS EXPECTED  Fundus firm, lochia light, vitals stable.     Problem: Potential for postpartum infection related to surgical incision, compromised uterine condition, urinary tract or respiratory compromise  Goal: Patient will be afebrile and free from signs and symptoms of infection  Outcome: PROGRESSING AS EXPECTED  Pt afebrile, incision approximated, open to air. Education provided regarding incision care. No signs of infection at this time.

## 2018-04-19 ENCOUNTER — POST PARTUM (OUTPATIENT)
Dept: OBGYN | Facility: CLINIC | Age: 28
End: 2018-04-19
Payer: MEDICAID

## 2018-04-19 VITALS — BODY MASS INDEX: 37.12 KG/M2 | SYSTOLIC BLOOD PRESSURE: 102 MMHG | WEIGHT: 237 LBS | DIASTOLIC BLOOD PRESSURE: 74 MMHG

## 2018-04-19 DIAGNOSIS — Z09 POSTOP CHECK: ICD-10-CM

## 2018-04-19 PROCEDURE — 99024 POSTOP FOLLOW-UP VISIT: CPT | Performed by: OBSTETRICS & GYNECOLOGY

## 2018-04-19 NOTE — NON-PROVIDER
Pt here for C/S check on 4/11/2018  Bleeding and pain is improving.  Breast Feeding- Bottle with breast milk  Pharmacy Confirmed  Phone Number [phone]  C/O: None   Pp apt on 5/17/2018, pt verbalized understanding of date.

## 2018-04-19 NOTE — PROGRESS NOTES
SUBJECTIVE:  Marie Gan presents to the clinic 1 weeks following C/S     Eating a regular diet without difficulty. Bowel movement are Normal.  Pain is controlled with current analgesics.  Medication(s) being used: prescription NSAID's including ibuprofen (Motrin), narcotic analgesics including oxycodone/acetaminophen (Percocet, Tylox). Spotting. Patient Denies Incisional pain, drainage or redness    OBJECTIVE:  /74   Wt 107.5 kg (237 lb)   LMP  (LMP Unknown)   BMI 37.12 kg/m²   Current Outpatient Prescriptions on File Prior to Visit   Medication Sig Dispense Refill   • oxyCODONE-acetaminophen (PERCOCET) 5-325 MG Tab Take 1 Tab by mouth every four hours as needed for up to 6 days. 30 Tab 0   • ibuprofen (MOTRIN) 800 MG Tab Take 1 Tab by mouth every 8 hours as needed (For cramping after delivery; do not give if patient is receiving ketorolac (Toradol)). 30 Tab 2   • docusate sodium 100 MG Cap Take 100 mg by mouth 2 Times a Day. 60 Cap 2   • Prenatal MV-Min-Fe Fum-FA-DHA (PRENATAL 1 PO) Take  by mouth.       No current facility-administered medications on file prior to visit.        Constitutional:  alert, no distress.  Abdomen:  soft, bowel sounds active, non-tender.  Incision:  healing well, no drainage, no erythema, no hernia, no seroma, no swelling, no dehiscence, incision well approximated.    IMPRESSION: Doing well postoperatively.  Pt is to increase activities as tolerated.    Lab:   Recent Results (from the past 1008 hour(s))   GRP B STREP, BY PCR (ALBERT BROTH)    Collection Time: 03/13/18  4:16 PM   Result Value Ref Range    Strep Gp B DNA PCR Negative Negative   CBC WITH DIFFERENTIAL    Collection Time: 04/11/18  7:54 AM   Result Value Ref Range    WBC 10.4 4.8 - 10.8 K/uL    RBC 4.68 4.20 - 5.40 M/uL    Hemoglobin 12.7 12.0 - 16.0 g/dL    Hematocrit 38.4 37.0 - 47.0 %    MCV 82.1 81.4 - 97.8 fL    MCH 27.1 27.0 - 33.0 pg    MCHC 33.1 (L) 33.6 - 35.0 g/dL    RDW 44.6 35.9 - 50.0 fL    Platelet  Count 303 164 - 446 K/uL    MPV 11.0 9.0 - 12.9 fL    Neutrophils-Polys 69.60 44.00 - 72.00 %    Lymphocytes 23.60 22.00 - 41.00 %    Monocytes 5.30 0.00 - 13.40 %    Eosinophils 0.70 0.00 - 6.90 %    Basophils 0.30 0.00 - 1.80 %    Immature Granulocytes 0.50 0.00 - 0.90 %    Nucleated RBC 0.00 /100 WBC    Neutrophils (Absolute) 7.22 (H) 2.00 - 7.15 K/uL    Lymphs (Absolute) 2.44 1.00 - 4.80 K/uL    Monos (Absolute) 0.55 0.00 - 0.85 K/uL    Eos (Absolute) 0.07 0.00 - 0.51 K/uL    Baso (Absolute) 0.03 0.00 - 0.12 K/uL    Immature Granulocytes (abs) 0.05 0.00 - 0.11 K/uL    NRBC (Absolute) 0.00 K/uL   HOLD BLOOD BANK SPECIMEN (NOT TESTED)    Collection Time: 04/11/18  7:54 AM   Result Value Ref Range    Holding Tube - Bb DONE    CBC without differential    Collection Time: 04/12/18  3:33 AM   Result Value Ref Range    WBC 10.5 4.8 - 10.8 K/uL    RBC 4.26 4.20 - 5.40 M/uL    Hemoglobin 11.5 (L) 12.0 - 16.0 g/dL    Hematocrit 35.8 (L) 37.0 - 47.0 %    MCV 84.0 81.4 - 97.8 fL    MCH 27.0 27.0 - 33.0 pg    MCHC 32.1 (L) 33.6 - 35.0 g/dL    RDW 46.5 35.9 - 50.0 fL    Platelet Count 232 164 - 446 K/uL    MPV 11.2 9.0 - 12.9 fL       PLAN:  Continue any current medications.  (See Med List for details.)  Return to clinic  : in 4 week(s).

## 2018-04-30 ENCOUNTER — TELEPHONE (OUTPATIENT)
Dept: OBGYN | Facility: CLINIC | Age: 28
End: 2018-04-30

## 2018-04-30 ENCOUNTER — HOSPITAL ENCOUNTER (OUTPATIENT)
Dept: LAB | Facility: MEDICAL CENTER | Age: 28
End: 2018-04-30
Attending: NURSE PRACTITIONER
Payer: MEDICAID

## 2018-04-30 DIAGNOSIS — N39.0 URINARY TRACT INFECTION WITH HEMATURIA, SITE UNSPECIFIED: ICD-10-CM

## 2018-04-30 DIAGNOSIS — R31.9 URINARY TRACT INFECTION WITH HEMATURIA, SITE UNSPECIFIED: ICD-10-CM

## 2018-04-30 PROCEDURE — 87086 URINE CULTURE/COLONY COUNT: CPT

## 2018-04-30 NOTE — TELEPHONE ENCOUNTER
C/O states she thinks she is having a UTI, states having pain w/ urination and bleeding, states vaginal pain and burning.  Consulted w/Tim Martines. CNM advised to go to lab and have urine culture done to be able to send the correct medication if Pos UTI.     Unable to contact pt back, msg left to call back.    @ 1100 pt was notified and advised to go to have lab work done. Verbalized understanding.

## 2018-05-02 LAB
BACTERIA UR CULT: NORMAL
SIGNIFICANT IND 70042: NORMAL
SITE SITE: NORMAL
SOURCE SOURCE: NORMAL

## 2018-05-03 ENCOUNTER — TELEPHONE (OUTPATIENT)
Dept: OBGYN | Facility: CLINIC | Age: 28
End: 2018-05-03

## 2018-05-03 NOTE — TELEPHONE ENCOUNTER
Pt called today 05/03/2018 at 11:00 am requesting lab result and states that is been bleeding for 3 weeks after delivery and also is experiencing painful urination.    Kavin Heller. C.N.M recommendation was to take ibuprofen and lots of water. Since her urinalysis is normal    Pt was informed and verbalized understanding. Not further questions

## 2018-05-17 ENCOUNTER — POST PARTUM (OUTPATIENT)
Dept: OBGYN | Facility: CLINIC | Age: 28
End: 2018-05-17
Payer: MEDICAID

## 2018-05-17 ENCOUNTER — HOSPITAL ENCOUNTER (OUTPATIENT)
Facility: MEDICAL CENTER | Age: 28
End: 2018-05-17
Attending: NURSE PRACTITIONER
Payer: MEDICAID

## 2018-05-17 VITALS
SYSTOLIC BLOOD PRESSURE: 118 MMHG | HEIGHT: 67 IN | WEIGHT: 227 LBS | BODY MASS INDEX: 35.63 KG/M2 | DIASTOLIC BLOOD PRESSURE: 78 MMHG

## 2018-05-17 DIAGNOSIS — Z98.891 HISTORY OF C-SECTION: ICD-10-CM

## 2018-05-17 DIAGNOSIS — O09.93 SUPERVISION OF HIGH RISK PREGNANCY IN THIRD TRIMESTER: ICD-10-CM

## 2018-05-17 DIAGNOSIS — O09.891 HISTORY OF PRETERM DELIVERY, CURRENTLY PREGNANT IN FIRST TRIMESTER: ICD-10-CM

## 2018-05-17 DIAGNOSIS — R87.622 LGSIL PAP SMEAR OF VAGINA: ICD-10-CM

## 2018-05-17 PROCEDURE — 88175 CYTOPATH C/V AUTO FLUID REDO: CPT

## 2018-05-17 PROCEDURE — 87591 N.GONORRHOEAE DNA AMP PROB: CPT

## 2018-05-17 PROCEDURE — 90050 PR POSTPARTUM VISIT: CPT | Performed by: NURSE PRACTITIONER

## 2018-05-17 PROCEDURE — 87491 CHLMYD TRACH DNA AMP PROBE: CPT

## 2018-05-17 NOTE — NON-PROVIDER
Pt here today for postpartum exam.  Delivery type & date:  2018    Birth control method desired: Tubal Ligation   Currently :breast feeding and  bottle feeding   LMP:  2018  Last pap:10/10/2017 abnormal   Phone # [phone]  Sad, or crying : yes

## 2018-05-17 NOTE — PROGRESS NOTES
Subjective   Subjective:    Marie Gan is a 27 y.o. female who presents for her postpartum exam s/p ltcs with BTL. Her prenatal course was complicated by the below problem list. Her postpartum course was complicated by dysuria with normal urine culture. She denies dysuria, vaginal bleeding, odor, itching or breast problems. She is both breast and bottle feeding. Reports good sex prior to this appointment. Eating a regular diet without difficulty. Bowel movement are Normal.  The patient is not having any pain. No current menses. Patient Denies Incisional pain, drainage or redness. Patient states having postpartum depression and has a mental health provider. Would like to restart her zoloft and is inquiring about zoloft with breast feeding.      Problem List     Patient Active Problem List    Diagnosis Date Noted   • History of - desires repeat with BTL 10/06/2017     Priority: High   • LGSIL Pap smear of vagina 10/10/2017     Priority: Medium   • History of PTD at 28wk - C/S due to NRFHT 10/06/2017     Priority: Medium   • Supervision of high risk pregnancy in third trimester 10/06/2017     Priority: Medium   • Post-op pain 2018       Objective    See PE  Lab: H&H upon discharge 11.5/35.8  Weight - 227 lb  Vitals - 118/78    Assessment   Assessment:    1. PP care of lactating women   2. Exam WNL   3. Pap abnormal with hx of colpo  4. Desires contraception not needed.     Plan   Plan:    1. Breastfeeding support   2. Continue PNV   3. Pap done today anticipate need for colpo  4. Encouraged condom use for std protection if needed.   5. Discussed diet, exercise and resumption of sexual activity   6. Preconception guidance for next pregnancy if applicable.    7.  Follow up needed - Please establish for gyn  8.  Smoking/etoh/drug screening - states no exposure.

## 2018-05-17 NOTE — PROGRESS NOTES
"Subjective:      Marie Gan is a 27 y.o. female who presents with No chief complaint on file.            HPI    ROS       Objective:     /78   Ht 1.702 m (5' 7\")   Wt 103 kg (227 lb)   LMP 2018   Breastfeeding? Yes   BMI 35.55 kg/m²      Physical Exam   Constitutional: She is oriented to person, place, and time. She appears well-developed and well-nourished.   HENT:   Head: Normocephalic.   Eyes: Pupils are equal, round, and reactive to light.   Neck: Normal range of motion. No thyromegaly present.   Cardiovascular: Normal rate, regular rhythm and normal heart sounds.    Pulmonary/Chest: Effort normal and breath sounds normal.   Abdominal: Soft. Bowel sounds are normal.   Genitourinary: Vagina normal and uterus normal.   Neurological: She is alert and oriented to person, place, and time.   Skin: Skin is warm and dry.   Psychiatric: She has a normal mood and affect. Her behavior is normal. Judgment and thought content normal.   Nursing note and vitals reviewed.              Assessment/Plan:     1. History of       2. LGSIL Pap smear of vagina      3. History of  delivery, currently pregnant in first trimester      4. Supervision of high risk pregnancy in third trimester        "

## 2018-07-16 NOTE — DISCHARGE INSTRUCTIONS
[place] [address] [facility] [facility] [address]  [phone]  Dept: [phone]    PRE-OP EVALUATION:  Today's date: 2018    Maegan Lira (: 1967) presents for pre-operative evaluation assessment as requested by Dr. Lau.  She requires evaluation and anesthesia risk assessment prior to undergoing surgery/procedure for treatment of  bilateral Cataract extraction with IOL    Proposed Surgery/ Procedure: Cataract removal  Date of Surgery/ Procedure: 18  Time of Surgery/ Procedure: [facility]  Hospital/Surgical Facility: [facility]  Fax number for surgical facility: [phone]  Primary Physician: Jelly Mcarthur  Type of Anesthesia Anticipated: General    Patient has a Health Care Directive or Living Will:  NO    1. NO - Do you have a history of heart attack, stroke, stent, bypass or surgery on an artery in the head, neck, heart or legs?  2. NO - Do you ever have any pain or discomfort in your chest?  3. NO - Do you have a history of  Heart Failure?  4. NO - Are you troubled by shortness of breath when: walking on the level, up a slight hill or at night?  5. NO - Do you currently have a cold, bronchitis or other respiratory infection?  6. NO - Do you have a cough, shortness of breath or wheezing?  7. NO - Do you sometimes get pains in the calves of your legs when you walk?  8. NO - Do you or anyone in your family have previous history of blood clots?  9. NO - Do you or does anyone in your family have a serious bleeding problem such as prolonged bleeding following surgeries or cuts?  10. NO - Have you ever had problems with anemia or been told to take iron pills?  11. NO - Have you had any abnormal blood loss such as black, tarry or bloody stools, or abnormal vaginal bleeding?  12. NO - Have you ever had a blood transfusion?  13. YES - HAVE YOU OR ANY OF YOUR RELATIVES EVER HAD PROBLEMS WITH ANESTHESIA? Has had vomiting with anesthesia ; has some problem  POSTPARTUM DISCHARGE INSTRUCTIONS FOR MOM    YOB: 1990   Age: 27 y.o.               Admit Date: 2018     Discharge Date: 2018  Attending Doctor:  Chelsea High M.D.                  Allergies:  Patient has no known allergies.    Discharged to home by car. Discharged via wheelchair, hospital escort: Yes.  Special equipment needed: Not Applicable  Belongings with: Personal  Be sure to schedule a follow-up appointment with your primary care doctor or any specialists as instructed.     Discharge Plan:   Diet Plan: Discussed  Activity Level: Discussed  Confirmed Follow up Appointment: Patient to Call and Schedule Appointment  Confirmed Symptoms Management: Discussed  Influenza Vaccine Indication: Not indicated: Previously immunized this influenza season and > 8 years of age    REASONS TO CALL YOUR OBSTETRICIAN:  1.   Persistent fever or shaking chills (Temperature higher than 100.4)  2.   Heavy bleeding (soaking more than 1 pad per hour); Passing clots  3.   Foul odor from vagina  4.   Mastitis (Breast infection; breast pain, chills, fever, redness)  5.   Urinary pain, burning or frequency  6.   Episiotomy infection  7.   Abdominal incision infection  8.   Severe depression longer than 24 hours    HAND WASHING  · Prior to handling the baby.  · Before breastfeeding or bottle feeding baby.  · After using the bathroom or changing the baby's diaper.    WOUND CARE  Ask your physician for additional care instructions.  In general:    ·  Incision:      · Keep clean and dry.    · Do NOT lift anything heavier than your baby for up to 6 weeks.    · There should not be any opening or pus.      VAGINAL CARE  · Nothing inside vagina for 6 weeks: no sexual intercourse, tampons or douching.  · Bleeding may continue for 2-4 weeks.  Amount may vary.    · Call your physician for heavy bleeding which means soaking more than 1 pad per hour    BIRTH CONTROL  · It is possible to become pregnant at any time  "after delivery and while breastfeeding.  · Plan to discuss a method of birth control with your physician at your follow up visit. visit.    DIET AND ELIMINATION  · Eating more fiber (bran cereal, fruits, and vegetables) and drinking plenty of fluids will help to avoid constipation.  · Urinary frequency after childbirth is normal.    POSTPARTUM BLUES  During the first few days after birth, you may experience a sense of the \"blues\" which may include impatience, irritability or even crying.  These feeling come and go quickly.  However, as many as 1 in 10 women experience emotional symptoms known as postpartum depression.    Postpartum depression:  May start as early as the second or third day after delivery or take several weeks or months to develop.  Symptoms of \"blues\" are present, but are more intense:  Crying spells; loss of appetite; feelings of hopelessness or loss of control; fear of touching the baby; over concern or no concern at all about the baby; little or no concern about your own appearance/caring for yourself; and/or inability to sleep or excessive sleeping.  Contact your physician if you are experiencing any of these symptoms.    Crisis Hotline:  · [place] Crisis Hotline:  5-644-YKLJNEK  Or 1-758.238.1208  · [place] Crisis Hotline:  1-157.452.1215  Or 372-970-5082    PREVENTING SHAKEN BABY:  If you are angry or stressed, PUT THE BABY IN THE CRIB, step away, take some deep breaths, and wait until you are calm to care for the baby.  DO NOT SHAKE THE BABY.  You are not alone, call a supporter for help.    · Crisis Call Center 24/7 crisis line 334-484-2603 or 1-804.760.3060  · You can also text them, text \"ANSWER\" to 165191    QUIT SMOKING/TOBACCO USE:  I understand the use of any tobacco products increases my chance of suffering from future heart disease and could cause other illnesses which may shorten my life. Quitting the use of tobacco products is the single most important thing I can do to improve my " with glucose dropping if she doesn't eat breakfast so plans to eat a small breakfast early for her 1PM surgery  14. NO - Do you have sleep apnea, excessive snoring or daytime drowsiness?  15. NO - Do you have any prosthetic heart valves?  16. NO - Do you have prosthetic joints?  17. NO - Is there any chance that you may be pregnant?      HPI:     HPI related to upcoming procedure: rapidly growing right eye cataract and smaller left eye cataract    See problem list for active medical problems.  Problems all longstanding and stable, except as noted/documented.  See ROS for pertinent symptoms related to these conditions.                                                                                                                                                          .    MEDICAL HISTORY:     Patient Active Problem List    Diagnosis Date Noted     Carpal tunnel syndrome, bilateral 06/01/2017     Priority: Medium     Tarsal tunnel syndrome of both lower extremities 05/02/2017     Priority: Medium     Melasma 05/02/2017     Priority: Medium     Sjogren's syndrome (H) 05/02/2017     Priority: Medium     Visual disturbance 12/05/2016     Priority: Medium     Elevated antinuclear antibody (ERIKA) level 11/26/2016     Priority: Medium     Non morbid obesity 11/06/2016     Priority: Medium     Bilateral foot pain 03/25/2016     Priority: Medium     Chronic pain syndrome 03/22/2016     Priority: Medium     Patient is followed by [facility] for ongoing prescription of pain medication.  All refills should be approved by this provider, or covering partner.    Medication(s): Multiple.     Controlled substance agreement:  Encounter-Level CSA - 6/16/16:               Controlled Substance Agreement - Scan on 6/24/2016  2:21 PM : CONTROLLED SUBSTANCE AGREEMENT (below)            Pain Clinic evaluation in the past: Yes       Date/Location:  [facility]    DIRE Total Score(s):  No flowsheet data found.    Last MNPMP  health. For further information on smoking / tobacco cessation call a Toll Free Quit Line at 1-193.635.9800 (*National Cancer [place]) or 1-462.956.2762 (American Lung Association) or you can access the web based program at www.lungusa.org.    · [place] Tobacco Users Help Line:  (325) 263-4124       Toll Free: 1-608.572.6428  · Quit Tobacco Program [facility] (158)332-5618    DEPRESSION / SUICIDE RISK:  As you are discharged from this [facility], it is important to learn how to keep safe from harming yourself.    Recognize the warning signs:  · Abrupt changes in personality, positive or negative- including increase in energy   · Giving away possessions  · Change in eating patterns- significant weight changes-  positive or negative  · Change in sleeping patterns- unable to sleep or sleeping all the time   · Unwillingness or inability to communicate  · Depression  · Unusual sadness, discouragement and loneliness  · Talk of wanting to die  · Neglect of personal appearance   · Rebelliousness- reckless behavior  · Withdrawal from people/activities they love  · Confusion- inability to concentrate     If you or a loved one observes any of these behaviors or has concerns about self-harm, here's what you can do:  · Talk about it- your feelings and reasons for harming yourself  · Remove any means that you might use to hurt yourself (examples: pills, rope, extension cords, firearm)  · Get professional help from the community (Mental Health, Substance Abuse, psychological counseling)  · Do not be alone:Call your Safe Contact- someone whom you trust who will be there for you.  · Call your local CRISIS HOTLINE 924-7992 or 801-032-5149  · Call your local Children's Mobile Crisis Response Team [place] (530) 774-4458 or www.1000museums.com  · Call the toll free National Suicide Prevention Hotlines   · National Suicide Prevention Lifeline 094-376-PCQN (6830)  · National Hope Line Network  website verification:    https://mnpmp-ph.[employer]/         Cervical disc disorder with radiculopathy 02/04/2016     Priority: Medium     Overview:   left radiation.  seen by neuro       History of adenomatous polyp of colon 02/04/2016     Priority: Medium     Overview:   adenomatous and hyperplastic. colo 2014, due 2019       Displacement of lumbar intervertebral disc without myelopathy 02/04/2016     Priority: Medium     Low back pain      Priority: Medium     Migraine      Priority: Medium     since age 21       Fibromyalgia      Priority: Medium     Colon polyps      Priority: Medium     colonoscopy x 2  last year clear        Lumbar disc herniation with radiculopathy 07/22/2015     Priority: Medium     Depression 02/28/2014     Priority: Medium     Recurrent sinusitis 01/09/2014     Priority: Medium     Irritable bowel syndrome 12/16/2011     Priority: Medium     Atopic rhinitis 12/08/2003     Priority: Medium      Past Medical History:   Diagnosis Date     Cataract     R     Colon polyps     colonoscopy x 2  last year clear      Fibromyalgia      Herniated lumbar disc without myelopathy     MRI X 3 / hospitalized x 2 /pain management      Low back pain      Migraine     since age 21     Recurrent sinus infections      Past Surgical History:   Procedure Laterality Date     GYN SURGERY      c section x2     ORTHOPEDIC SURGERY      rt foot     TONSILLECTOMY       Current Outpatient Prescriptions   Medication Sig Dispense Refill     Butalbital-APAP-Caffeine -40 MG CAPS Take one capsule as needed for migraine. Max one capsule daily. Do not take with opiate pain medications including oxycodone. 30 capsule 1     diazepam (VALIUM) 5 MG tablet Take 1 tablet (5 mg) by mouth every 6 hours as needed for anxiety, sleep or muscle spasms If you take this medication, do not take any other muscle relaxants. 30 tablet 0     hydrOXYzine (ATARAX) 25 MG tablet TAKE 1 TO 2 TABLETS(25 TO 50 MG) BY MOUTH EVERY 6 HOURS AS  800-SUICIDE ([phone])    DISCHARGE SURVEY:  Thank you for choosing [facility].  We hope we provided you with very good care.  You may be receiving a survey in the mail.  Please fill it out.  Your opinion is valuable to us.    ADDITIONAL EDUCATIONAL MATERIALS GIVEN TO PATIENT:        My signature on this form indicates that:  1.  I have reviewed and understand the above information  2.  My questions regarding this information have been answered to my satisfaction.  3.  I have formulated a plan with my discharge nurse to obtain my prescribed medication for home.     NEEDED FOR ANXIETY 60 tablet 3     naproxen (NAPROSYN) 375 MG tablet Take 1 tablet (375 mg) by mouth 2 times daily as needed for moderate pain 60 tablet 3     omeprazole (PRILOSEC) 20 MG CR capsule TAKE 1 CAPSULE BY MOUTH DAILY AS NEEDED 30 capsule 1     ondansetron (ZOFRAN-ODT) 4 MG ODT tab Take 1 tablet (4 mg) by mouth daily as needed for nausea 30 tablet 3     senna-docusate (KATIANA-COLACE) 8.6-50 MG per tablet Take 1-2 tablets by mouth 2 times daily as needed for constipation 90 tablet 3     UNABLE TO FIND Take 10 drops by mouth 2 times daily MEDICATION NAME: CBD Max Hemp Oil       DULoxetine (CYMBALTA) 30 MG EC capsule TAKE 1 CAPSULE BY MOUTH EVERY DAY 90 capsule 2     OTC products: None, except as noted above    Allergies   Allergen Reactions     Ampicillin      Codeine      Doxycycline GI Disturbance     Keflex [Cephalexin] Rash     Latex Rash      Latex Allergy: YES: Precautions to take: per protocol    Social History   Substance Use Topics     Smoking status: Never Smoker     Smokeless tobacco: Never Used     Alcohol use 0.0 oz/week     0 Standard drinks or equivalent per week      Comment: 6 drinks per week     History   Drug Use No       REVIEW OF SYSTEMS:   CONSTITUTIONAL: NEGATIVE for fever, chills, change in weight  INTEGUMENTARY/SKIN: NEGATIVE for worrisome rashes, moles or lesions  EYES: NEGATIVE for vision changes or irritation  ENT/MOUTH: NEGATIVE for ear, mouth and throat problems  RESP: NEGATIVE for significant cough or SOB  CV: NEGATIVE for chest pain, palpitations or peripheral edema  GI: NEGATIVE for nausea, abdominal pain, heartburn, or change in bowel habits  : NEGATIVE for frequency, dysuria, or hematuria  MUSCULOSKELETAL: NEGATIVE for significant arthralgias or myalgia; has fibromyalgia and chronic muscle pain  NEURO: NEGATIVE for weakness, dizziness or paresthesias  ENDOCRINE: NEGATIVE for temperature intolerance, skin/hair changes;  Reports episodes of hypoglycemia if she does not eat  something for breakfast  HEME: NEGATIVE for bleeding problems  PSYCHIATRIC: NEGATIVE for changes in mood or affect    EXAM:   /88 (BP Location: Right arm, Patient Position: Sitting, Cuff Size: Adult Regular)  Pulse 76  Temp 99.3  F (37.4  C) (Oral)  Wt 198 lb (89.8 kg)  SpO2 98%  BMI 35.64 kg/m2     GENERAL APPEARANCE: healthy, alert and no distress  HENT: ear canals and TM's normal and nose and mouth without ulcers or lesions  RESP: lungs clear to auscultation - no rales, rhonchi or wheezes  CV: regular rate and rhythm, normal S1 S2, no S3 or S4 and no murmur, click or rub   ABDOMEN: soft, nontender, no HSM or masses and bowel sounds normal  NEURO: Normal strength and tone, sensory exam grossly normal, mentation intact and speech normal    DIAGNOSTICS:   EKG: Not indicated due to non-vascular surgery and low risk of event (age <65 and without cardiac risk factors)    Recent Labs   Lab Test  07/15/17   1037  10/24/16   1534 11/05/15 02/24/15   HGB  14.5  14.5  14.5  14.5   PLT  331  348  307  266   NA  140  138  138  138   POTASSIUM  3.8  3.4  4.3  3.9   CR  0.86  0.87  0.84  0.82   A1C   --    --   4.9  5.2        IMPRESSION:   Reason for surgery/procedure: bilateral cataracts  Diagnosis/reason for consult: pre op consult    The proposed surgical procedure is considered LOW risk.    REVISED CARDIAC RISK INDEX  The patient has the following serious cardiovascular risks for perioperative complications such as (MI, PE, VFib and 3  AV Block):  No serious cardiac risks  INTERPRETATION: 0 risks: Class I (very low risk - 0.4% complication rate)    The patient has the following additional risks for perioperative complications:  No identified additional risks      ICD-10-CM    1. Preop general physical exam Z01.818    2. Cataract of both eyes, unspecified cataract type H26.9        RECOMMENDATIONS:     --Patient is to take all scheduled medications on the day of surgery EXCEPT for modifications listed  below.    DOS will eat small protein containing meal 8 hours before surgery time  Will hold naproxen and supplements for 1 week prior to surgery  Will begin taking omeprazole heartburn now and over course of the 2 surgeries      Recommend precautions due to latex/adhesive allergy  Recommend perioperative glucose monitoring      APPROVAL GIVEN to proceed with proposed procedure, without further diagnostic evaluation       Signed Electronically by: TONI Montero CNP    Copy of this evaluation report is provided to requesting physician.    [facility] Preop Guidelines    Revised Cardiac Risk Index

## 2020-09-25 ENCOUNTER — HOSPITAL ENCOUNTER (EMERGENCY)
Facility: MEDICAL CENTER | Age: 30
End: 2020-09-25
Attending: EMERGENCY MEDICINE
Payer: MEDICAID

## 2020-09-25 ENCOUNTER — APPOINTMENT (OUTPATIENT)
Dept: RADIOLOGY | Facility: MEDICAL CENTER | Age: 30
End: 2020-09-25
Attending: EMERGENCY MEDICINE
Payer: MEDICAID

## 2020-09-25 VITALS
RESPIRATION RATE: 18 BRPM | BODY MASS INDEX: 36.37 KG/M2 | DIASTOLIC BLOOD PRESSURE: 73 MMHG | WEIGHT: 231.7 LBS | OXYGEN SATURATION: 94 % | HEART RATE: 84 BPM | HEIGHT: 67 IN | TEMPERATURE: 98.2 F | SYSTOLIC BLOOD PRESSURE: 109 MMHG

## 2020-09-25 DIAGNOSIS — F41.9 ANXIETY: ICD-10-CM

## 2020-09-25 DIAGNOSIS — R07.9 CHEST PAIN, UNSPECIFIED TYPE: ICD-10-CM

## 2020-09-25 LAB — EKG IMPRESSION: NORMAL

## 2020-09-25 PROCEDURE — 93005 ELECTROCARDIOGRAM TRACING: CPT

## 2020-09-25 PROCEDURE — 99283 EMERGENCY DEPT VISIT LOW MDM: CPT

## 2020-09-25 PROCEDURE — 93005 ELECTROCARDIOGRAM TRACING: CPT | Performed by: EMERGENCY MEDICINE

## 2020-09-25 RX ORDER — LORAZEPAM 1 MG/1
1 TABLET ORAL ONCE
Status: DISCONTINUED | OUTPATIENT
Start: 2020-09-25 | End: 2020-09-25 | Stop reason: HOSPADM

## 2020-09-25 ASSESSMENT — LIFESTYLE VARIABLES: DO YOU DRINK ALCOHOL: NO
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
## 2020-09-25 NOTE — ED NOTES
Patient left without speaking to RN. Md aware. He states he discussed AMA with during his assessment.

## 2020-09-25 NOTE — ED NOTES
Patient walked with a steady gate at this time to [facility] room 36. Placed patient into gown, on auto bp, on heart monitor, spo2, gave warm blanket, and call light. Ready to be seen  rn at bedside

## 2020-09-25 NOTE — ED NOTES
Pt is declining for RN to start IV or draw labs at this time. Educated she can speak with MD before draw blood

## 2021-02-03 ENCOUNTER — HOSPITAL ENCOUNTER (OUTPATIENT)
Facility: MEDICAL CENTER | Age: 31
End: 2021-02-03
Attending: PHYSICIAN ASSISTANT
Payer: MEDICAID

## 2021-02-03 PROCEDURE — U0003 INFECTIOUS AGENT DETECTION BY NUCLEIC ACID (DNA OR RNA); SEVERE ACUTE RESPIRATORY SYNDROME CORONAVIRUS 2 (SARS-COV-2) (CORONAVIRUS DISEASE [COVID-19]), AMPLIFIED PROBE TECHNIQUE, MAKING USE OF HIGH THROUGHPUT TECHNOLOGIES AS DESCRIBED BY CMS-2020-01-R: HCPCS

## 2021-02-03 PROCEDURE — U0005 INFEC AGEN DETEC AMPLI PROBE: HCPCS

## 2021-02-04 LAB
COVID ORDER STATUS COVID19: NORMAL
SARS-COV-2 RNA RESP QL NAA+PROBE: NOTDETECTED
SPECIMEN SOURCE: NORMAL

## 2021-03-31 ENCOUNTER — HOSPITAL ENCOUNTER (OUTPATIENT)
Facility: MEDICAL CENTER | Age: 31
End: 2021-03-31
Attending: NURSE PRACTITIONER
Payer: MEDICAID

## 2021-03-31 PROCEDURE — U0003 INFECTIOUS AGENT DETECTION BY NUCLEIC ACID (DNA OR RNA); SEVERE ACUTE RESPIRATORY SYNDROME CORONAVIRUS 2 (SARS-COV-2) (CORONAVIRUS DISEASE [COVID-19]), AMPLIFIED PROBE TECHNIQUE, MAKING USE OF HIGH THROUGHPUT TECHNOLOGIES AS DESCRIBED BY CMS-2020-01-R: HCPCS

## 2021-03-31 PROCEDURE — U0005 INFEC AGEN DETEC AMPLI PROBE: HCPCS

## 2022-03-02 ENCOUNTER — NON-PROVIDER VISIT (OUTPATIENT)
Dept: URGENT CARE | Facility: PHYSICIAN GROUP | Age: 32
End: 2022-03-02

## 2022-03-02 DIAGNOSIS — Z02.1 PRE-EMPLOYMENT DRUG SCREENING: ICD-10-CM

## 2022-03-02 LAB
AMP AMPHETAMINE: NORMAL
BAR BARBITURATES: NORMAL
BZO BENZODIAZEPINES: NORMAL
COC COCAINE: NORMAL
INT CON NEG: NORMAL
INT CON POS: NORMAL
MDMA ECSTASY: NORMAL
MET METHAMPHETAMINES: NORMAL
MTD METHADONE: NORMAL
OPI OPIATES: NORMAL
OXY OXYCODONE: NORMAL
PCP PHENCYCLIDINE: NORMAL
POC URINE DRUG SCREEN OCDRS: NEGATIVE
THC: NORMAL

## 2022-03-02 PROCEDURE — 80305 DRUG TEST PRSMV DIR OPT OBS: CPT | Performed by: PHYSICIAN ASSISTANT

## 2022-05-11 ENCOUNTER — APPOINTMENT (OUTPATIENT)
Dept: URGENT CARE | Facility: PHYSICIAN GROUP | Age: 32
End: 2022-05-11

## 2022-11-21 ENCOUNTER — OFFICE VISIT (OUTPATIENT)
Dept: URGENT CARE | Facility: PHYSICIAN GROUP | Age: 32
End: 2022-11-21
Payer: MEDICAID

## 2022-11-21 VITALS
BODY MASS INDEX: 37.51 KG/M2 | WEIGHT: 239 LBS | OXYGEN SATURATION: 98 % | TEMPERATURE: 97.4 F | HEART RATE: 102 BPM | RESPIRATION RATE: 16 BRPM | HEIGHT: 67 IN | DIASTOLIC BLOOD PRESSURE: 64 MMHG | SYSTOLIC BLOOD PRESSURE: 126 MMHG

## 2022-11-21 DIAGNOSIS — J02.0 STREP THROAT: ICD-10-CM

## 2022-11-21 LAB
INT CON NEG: NORMAL
INT CON POS: NORMAL
S PYO AG THROAT QL: POSITIVE

## 2022-11-21 PROCEDURE — 99213 OFFICE O/P EST LOW 20 MIN: CPT | Performed by: FAMILY MEDICINE

## 2022-11-21 PROCEDURE — 87880 STREP A ASSAY W/OPTIC: CPT | Performed by: FAMILY MEDICINE

## 2022-11-21 RX ORDER — AMOXICILLIN 500 MG/1
500 CAPSULE ORAL 2 TIMES DAILY
Qty: 20 CAPSULE | Refills: 0 | Status: SHIPPED | OUTPATIENT
Start: 2022-11-21 | End: 2022-12-01

## 2022-11-21 RX ORDER — ARMODAFINIL 50 MG/1
TABLET ORAL
COMMUNITY
End: 2023-06-16

## 2022-11-21 NOTE — PROGRESS NOTES
"CC:  presents with Pharyngitis            Pharyngitis   This is a new problem. The current episode started in the past 3 days. The problem has been unchanged. There has been subj fever. The pain is mild. Associated symptoms include a dry cough. Pertinent negatives include no abdominal pain,   diarrhea, headaches, shortness of breath or vomiting. no exposure to strep or mono.   has tried acetaminophen for the symptoms. The treatment provided mild relief.     Social History     Tobacco Use    Smoking status: Former     Packs/day: 0.25     Years: 3.00     Pack years: 0.75     Types: Cigarettes     Quit date: 2016     Years since quittin.6    Smokeless tobacco: Never   Substance Use Topics    Alcohol use: No    Drug use: No       No past medical history on file.    Review of Systems    HENT: Positive for sore throat  Respiratory: Negative for  sputum production and shortness of breath.    Cardiovascular: Negative for chest pain.   Gastrointestinal: Negative for nausea, vomiting, abdominal pain and diarrhea.   Genitourinary: Negative.    Neurological: Negative for dizziness and headaches.   All other systems reviewed and are negative.         Objective:   /64   Pulse (!) 102   Temp 36.3 °C (97.4 °F) (Temporal)   Resp 16   Ht 1.702 m (5' 7\")   Wt 108 kg (239 lb)   SpO2 98%         Physical Exam   Constitutional:   oriented to person, place, and time.  appears well-developed and well-nourished. No distress.   HENT:   Head: Normocephalic and atraumatic.   Right Ear: External ear normal.   Left Ear: External ear normal.   Nose: Mucosal edema present. Right sinus exhibits no maxillary sinus tenderness and no frontal sinus tenderness. Left sinus exhibits no maxillary sinus tenderness and no frontal sinus tenderness.   Mouth/Throat: no posterior oropharyngeal exudate.   There is posterior oropharyngeal erythema present. No posterior oropharyngeal edema.   Tonsils 2+ bilaterally     Eyes: Conjunctivae and EOM " are normal. Pupils are equal, round, and reactive to light. Right eye exhibits no discharge. Left eye exhibits no discharge. No scleral icterus.   Neck: Normal range of motion. Neck supple. No JVD present. No tracheal deviation present. No thyromegaly present.   Cardiovascular: Normal rate, regular rhythm, normal heart sounds and intact distal pulses.  Exam reveals no friction rub.    No murmur heard.  Pulmonary/Chest: Effort normal and breath sounds normal. No respiratory distress.   no wheezes.   no rales.    Musculoskeletal:  exhibits no edema.   Lymphadenopathy:    no cervical LAD  Neurological:   alert and oriented to person, place, and time.   Skin: Skin is warm and dry. No erythema.   Psychiatric:   normal mood and affect.   Nursing note and vitals reviewed.             Assessment/Plan:          1. Strep throat     - POCT Rapid Strep A positive  - MBX (diphenhydrAMINE-lidocaine-Maalox) oral susp Cup; Take 5 mL by mouth every 6 hours as needed (sore throat).  Dispense: 150 mL; Refill: 0  - amoxicillin (AMOXIL) 500 MG Cap; Take 1 Capsule by mouth 2 times a day for 10 days.  Dispense: 20 Capsule; Refill: 0      Differential diagnosis, natural history, supportive care, and indications for immediate follow-up discussed. All questions answered. Patient agrees with the plan of care.     Follow-up as needed if symptoms worsen or fail to improve to PCP, Urgent care or Emergency Room.     I have personally reviewed prior external notes and test results pertinent to today's visit.  I have independently reviewed and interpreted all diagnostics ordered during this urgent care acute visit.

## 2023-02-03 ENCOUNTER — OFFICE VISIT (OUTPATIENT)
Dept: URGENT CARE | Facility: PHYSICIAN GROUP | Age: 33
End: 2023-02-03
Payer: MEDICAID

## 2023-02-03 VITALS
RESPIRATION RATE: 16 BRPM | TEMPERATURE: 97.6 F | SYSTOLIC BLOOD PRESSURE: 124 MMHG | HEIGHT: 67 IN | BODY MASS INDEX: 37.04 KG/M2 | HEART RATE: 110 BPM | OXYGEN SATURATION: 94 % | DIASTOLIC BLOOD PRESSURE: 67 MMHG | WEIGHT: 236 LBS

## 2023-02-03 DIAGNOSIS — J02.0 STREP THROAT: ICD-10-CM

## 2023-02-03 LAB
INT CON NEG: NORMAL
INT CON POS: NORMAL
S PYO AG THROAT QL: NORMAL

## 2023-02-03 PROCEDURE — 87880 STREP A ASSAY W/OPTIC: CPT

## 2023-02-03 PROCEDURE — 99213 OFFICE O/P EST LOW 20 MIN: CPT

## 2023-02-03 RX ORDER — AMOXICILLIN 500 MG/1
500 CAPSULE ORAL 2 TIMES DAILY
Qty: 20 CAPSULE | Refills: 0 | Status: SHIPPED | OUTPATIENT
Start: 2023-02-03 | End: 2023-06-16

## 2023-02-03 ASSESSMENT — ENCOUNTER SYMPTOMS
COUGH: 0
SHORTNESS OF BREATH: 0
FEVER: 1
SORE THROAT: 1
WHEEZING: 0
CHILLS: 1

## 2023-02-03 NOTE — NON-PROVIDER
"Subjective:   Marie Poon is a 32 y.o. female who presents for pharyngitis x 3 days.      HPI  Symptoms of  fatigue , sore throat, pain with swallowing, myalgias, fever, chills, and headache x 3 days. Symptoms have been getting worse, she has taken tylenol/advil with minimal relief.  Fever at home was 102.6  yesterday and states her fever \"broke\" last night.  is also sick with similar symptoms. She had strep in November and was treated with amoxicillin which resolved her symptoms.   Review of Systems   Constitutional:  Positive for chills, fever and malaise/fatigue.   HENT:  Positive for ear pain and sore throat.    Respiratory:  Negative for cough, shortness of breath and wheezing.     Objective:   /67   Pulse (!) 110   Temp 36.4 °C (97.6 °F) (Temporal)   Resp 16   Ht 1.702 m (5' 7\")   Wt 107 kg (236 lb)   SpO2 94%   BMI 36.96 kg/m²   Physical Exam  Constitutional:       Appearance: Normal appearance.   HENT:      Head: Normocephalic.      Ears:      Comments: Bilateral TM erythematous with no bulging.      Nose: Rhinorrhea present.      Mouth/Throat:      Mouth: Mucous membranes are moist.      Pharynx: Posterior oropharyngeal erythema present.      Comments: Pharyngeal erythema noted. Tonsils 3+ bilaterally. No exudate noted.   Eyes:      Pupils: Pupils are equal, round, and reactive to light.   Cardiovascular:      Rate and Rhythm: Normal rate and regular rhythm.   Pulmonary:      Effort: Pulmonary effort is normal.      Breath sounds: Normal breath sounds.   Lymphadenopathy:      Cervical: Cervical adenopathy present.   Skin:     General: Skin is warm and dry.   Neurological:      General: No focal deficit present.      Mental Status: She is alert and oriented to person, place, and time.        Assessment/Plan:   1. Pharyngitis, unspecified etiology  - POCT Rapid Strep A    Results for orders placed or performed in visit on 02/03/23   POCT Rapid Strep A   Result Value Ref Range    " Rapid Strep Screen pos     Internal Control Positive Valid     Internal Control Negative Valid       Differential diagnosis, natural history, supportive care, and indications for immediate follow-up discussed.     Start amoxicillin 500 mg BID x 10 days. Recommend continued use of tylenol/advil for pain or fever. Instructions given to throw away toothbrush and practice hand hygiene. Follow up if symptoms worsen or fail to improve with antibiotic treatment.

## 2023-02-03 NOTE — PROGRESS NOTES
Subjective:   Marie Poon is a 32 y.o. female who presents for Pharyngitis (Tonsils are swollen. Fatigue. Migraine, fever broke yesterday. Symptoms started Wednesday, getting worse.   )      HPI:    Patient presents to urgent care with concerns with sore throat. Symptoms started two days ago. Associated symptoms: swollen tonsils, headache, fever. She reports dysphagia. She states she had strep throat in 2022 and her current symptoms remind her of similar illness in November. Denies runny nose, congestion, cough.       ROS As above in HPI    Medications:    Current Outpatient Medications on File Prior to Visit   Medication Sig Dispense Refill    Armodafinil 50 MG Tab Nuvigil 50 mg tablet   Take 1 tablet as needed by oral route in the morning for 30 days.      MBX (diphenhydrAMINE-lidocaine-Maalox) oral susp Cup Take 5 mL by mouth every 6 hours as needed (sore throat). 150 mL 0    ibuprofen (MOTRIN) 800 MG Tab Take 1 Tab by mouth every 8 hours as needed (For cramping after delivery; do not give if patient is receiving ketorolac (Toradol)). 30 Tab 2    docusate sodium 100 MG Cap Take 100 mg by mouth 2 Times a Day. 60 Cap 2    Prenatal MV-Min-Fe Fum-FA-DHA (PRENATAL 1 PO) Take  by mouth.       No current facility-administered medications on file prior to visit.        Allergies:   Patient has no known allergies.    Problem List:   Patient Active Problem List   Diagnosis    History of - desires repeat with BTL    History of PTD at 28wk - C/S due to [facility]    Supervision of high risk pregnancy in third trimester    LGSIL Pap smear of vagina    Post-op pain        Surgical History:  Past Surgical History:   Procedure Laterality Date    REPEAT C SECTION W TUBAL LIGATION  2018    Procedure: REPEAT C SECTION WITH TUBAL LIGATION;  Surgeon: Chelsea High M.D.;  Location: LABOR AND DELIVERY;  Service: Obstetrics    PRIMARY C SECTION  10/5/2016    Procedure: PRIMARY C SECTION;  Surgeon: Margarette MOSLEY  "Francis Arriaga M.D.;  Location: LABOR AND DELIVERY;  Service:        Past Social Hx:   Social History     Tobacco Use    Smoking status: Former     Packs/day: 0.25     Years: 3.00     Pack years: 0.75     Types: Cigarettes     Quit date: 2016     Years since quittin.8    Smokeless tobacco: Never   Substance Use Topics    Alcohol use: No    Drug use: No          Problem list, medications, and allergies reviewed by myself today in Epic.     Objective:     /67   Pulse (!) 110   Temp 36.4 °C (97.6 °F) (Temporal)   Resp 16   Ht 1.702 m (5' 7\")   Wt 107 kg (236 lb)   SpO2 94%   BMI 36.96 kg/m²     Physical Exam  Vitals and nursing note reviewed.   Constitutional:       Appearance: Normal appearance.   HENT:      Head: Normocephalic and atraumatic.      Right Ear: Tympanic membrane and ear canal normal.      Left Ear: Tympanic membrane and ear canal normal.      Nose: Nose normal.      Mouth/Throat:      Mouth: Mucous membranes are moist.      Pharynx: Uvula midline. Pharyngeal swelling and posterior oropharyngeal erythema present.      Tonsils: No tonsillar exudate. 3+ on the right. 3+ on the left.   Eyes:      Conjunctiva/sclera: Conjunctivae normal.      Pupils: Pupils are equal, round, and reactive to light.   Cardiovascular:      Rate and Rhythm: Normal rate and regular rhythm.      Heart sounds: Normal heart sounds.   Pulmonary:      Effort: Pulmonary effort is normal.      Breath sounds: Normal breath sounds.   Abdominal:      General: Bowel sounds are normal.      Palpations: Abdomen is soft.   Lymphadenopathy:      Cervical: Cervical adenopathy present.   Skin:     General: Skin is warm and dry.      Capillary Refill: Capillary refill takes less than 2 seconds.      Findings: No rash.   Neurological:      Mental Status: She is alert and oriented to person, place, and time.       Assessment/Plan:       Results for orders placed or performed in visit on 23   POCT Rapid Strep A   Result " Value Ref Range    Rapid Strep Screen pos     Internal Control Positive Valid     Internal Control Negative Valid        Diagnosis and associated orders:   1. Strep throat  - POCT Rapid Strep A  - amoxicillin (AMOXIL) 500 MG Cap; Take 1 Capsule by mouth 2 times a day.  Dispense: 20 Capsule; Refill: 0        Comments/MDM:     Rapid strep is positive  Hygiene measures reviewed to prevent coinfection  Supportive measures encouraged: Rest, increased oral hydration, NSAIDs/tylenol as needed per package instructions, lozenges, diet as tolerated.           Pt is clinically stable at today's acute urgent care visit.  No acute distress noted. Appropriate for outpatient management at this time.       Discussed DDx, management options (risks,benefits, and alternatives to planned treatment), natural progression and supportive care.  Expressed understanding and the treatment plan was agreed upon. Questions were encouraged and answered   Return to urgent care prn if new or worsening sx or if there is no improvement in condition prn.    Educated in Red flags and indications to immediately call 911 or present to the Emergency Department.     Please note that this dictation was created using voice recognition software. I have made a reasonable attempt to correct obvious errors, but I expect that there are errors of grammar and possibly content that I did not discover before finalizing the note.    This note was electronically signed by Kathy Fernandes, LAURA

## 2023-03-23 ENCOUNTER — OFFICE VISIT (OUTPATIENT)
Dept: URGENT CARE | Facility: PHYSICIAN GROUP | Age: 33
End: 2023-03-23
Payer: MEDICAID

## 2023-03-23 VITALS
HEIGHT: 62 IN | WEIGHT: 243 LBS | SYSTOLIC BLOOD PRESSURE: 122 MMHG | BODY MASS INDEX: 44.72 KG/M2 | RESPIRATION RATE: 18 BRPM | OXYGEN SATURATION: 98 % | TEMPERATURE: 97.8 F | DIASTOLIC BLOOD PRESSURE: 68 MMHG | HEART RATE: 68 BPM

## 2023-03-23 DIAGNOSIS — R05.1 ACUTE COUGH: ICD-10-CM

## 2023-03-23 DIAGNOSIS — J02.9 PHARYNGITIS, UNSPECIFIED ETIOLOGY: ICD-10-CM

## 2023-03-23 LAB — S PYO DNA SPEC NAA+PROBE: NOT DETECTED

## 2023-03-23 PROCEDURE — 87651 STREP A DNA AMP PROBE: CPT

## 2023-03-23 PROCEDURE — 99213 OFFICE O/P EST LOW 20 MIN: CPT

## 2023-03-23 RX ORDER — DEXTROMETHORPHAN HYDROBROMIDE AND PROMETHAZINE HYDROCHLORIDE 15; 6.25 MG/5ML; MG/5ML
5 SYRUP ORAL EVERY 6 HOURS PRN
Qty: 100 ML | Refills: 0 | Status: SHIPPED | OUTPATIENT
Start: 2023-03-23 | End: 2023-06-16

## 2023-03-23 ASSESSMENT — ENCOUNTER SYMPTOMS
VOMITING: 0
FEVER: 0
ABDOMINAL PAIN: 1
DIARRHEA: 0
EYE PAIN: 1
EYE DISCHARGE: 1
SHORTNESS OF BREATH: 0
HEADACHES: 0
COUGH: 1
SORE THROAT: 1
SPUTUM PRODUCTION: 0
NAUSEA: 0
WHEEZING: 0
CHILLS: 0
EYE REDNESS: 1

## 2023-03-23 ASSESSMENT — VISUAL ACUITY: OU: 1

## 2023-03-23 NOTE — PROGRESS NOTES
Subjective:     Marie Poon is a 32 y.o. female who presents for Eye Drainage (Pt states she woke with extra drainage and they both feel itchy ), Pharyngitis, and Otalgia      Eye Drainage  The current episode started yesterday. The problem has been gradually worsening. Associated symptoms include abdominal pain, congestion, coughing and a sore throat. Pertinent negatives include no chest pain, chills, fever, headaches, nausea or vomiting. Associated symptoms comments: Ear pain, right eye pain and discharge . She has tried NSAIDs (Warm compress to right eye) for the symptoms. The treatment provided mild relief.     Review of Systems   Constitutional:  Negative for chills and fever.   HENT:  Positive for congestion and sore throat.    Eyes:  Positive for pain, discharge and redness.        Right eye    Respiratory:  Positive for cough. Negative for sputum production, shortness of breath and wheezing.    Cardiovascular:  Negative for chest pain.   Gastrointestinal:  Positive for abdominal pain. Negative for diarrhea, nausea and vomiting.   Neurological:  Negative for headaches.   All other systems reviewed and are negative.    PMH: No past medical history on file.  ALLERGIES: No Known Allergies  SURGHX:   Past Surgical History:   Procedure Laterality Date    REPEAT C SECTION W TUBAL LIGATION  2018    Procedure: REPEAT C SECTION WITH TUBAL LIGATION;  Surgeon: Chelsea High M.D.;  Location: LABOR AND DELIVERY;  Service: Obstetrics    PRIMARY C SECTION  10/5/2016    Procedure: PRIMARY C SECTION;  Surgeon: Margarette Arriaga M.D.;  Location: LABOR AND DELIVERY;  Service:      SOCHX:   Social History     Socioeconomic History    Marital status: Single   Tobacco Use    Smoking status: Former     Packs/day: 0.25     Years: 3.00     Pack years: 0.75     Types: Cigarettes     Quit date: 2016     Years since quittin.9    Smokeless tobacco: Never   Substance and Sexual Activity    Alcohol use:  "No    Drug use: No    Sexual activity: Yes     Partners: Male     Birth control/protection: Pill   Social History Narrative    ** Merged History Encounter **          FH:   Family History   Problem Relation Age of Onset    Cancer Maternal Grandmother     Cancer Paternal Grandmother         breast cancer    Diabetes Paternal Grandfather          Objective:   /68   Pulse 68   Temp 36.6 °C (97.8 °F) (Temporal)   Resp 18   Ht 1.575 m (5' 2\")   Wt 110 kg (243 lb)   SpO2 98%   BMI 44.45 kg/m²     Physical Exam  Vitals reviewed.   Constitutional:       General: She is not in acute distress.     Appearance: Normal appearance. She is not ill-appearing.   HENT:      Head: Normocephalic and atraumatic.      Right Ear: Tympanic membrane, ear canal and external ear normal.      Nose: Nose normal.      Mouth/Throat:      Mouth: Mucous membranes are moist.      Pharynx: Posterior oropharyngeal erythema present. No oropharyngeal exudate.   Eyes:      General: Lids are normal. Lids are everted, no foreign bodies appreciated. Vision grossly intact.         Right eye: Discharge present. No foreign body or hordeolum.      Extraocular Movements: Extraocular movements intact.      Conjunctiva/sclera:      Right eye: Right conjunctiva is injected. No chemosis, exudate or hemorrhage.     Left eye: Left conjunctiva is not injected. No chemosis, exudate or hemorrhage.     Pupils: Pupils are equal, round, and reactive to light.      Comments: Clear discharge from right eye    Cardiovascular:      Rate and Rhythm: Normal rate and regular rhythm.   Pulmonary:      Effort: Pulmonary effort is normal. No respiratory distress.      Breath sounds: No stridor. No wheezing, rhonchi or rales.   Chest:      Chest wall: No tenderness.   Musculoskeletal:         General: Normal range of motion.      Cervical back: Normal range of motion.   Skin:     General: Skin is warm and dry.   Neurological:      Mental Status: She is alert. "   Psychiatric:         Mood and Affect: Mood normal.         Behavior: Behavior normal.         Thought Content: Thought content normal.     Results for orders placed or performed in visit on 03/23/23   POCT GROUP A STREP, PCR   Result Value Ref Range    POC Group A Strep, PCR Not Detected Not Detected, Invalid       Assessment/Plan:   Assessment      1. Pharyngitis, unspecified etiology  - POCT GROUP A STREP, PCR    2. Acute cough  - promethazine-dextromethorphan (PROMETHAZINE-DM) 6.25-15 MG/5ML syrup; Take 5 mL by mouth every 6 hours as needed for Cough for up to 20 doses.  Dispense: 100 mL; Refill: 0     Negative STREP PCR in clinic. Based on patient's symptoms, symptom duration, and physical exam findings, it was discussed with patient that the likely etiology of the illness is viral. Prescription for cough supressant called in to patients preferred pharmacy. Supportive care is reviewed with patient/caregiver - recommend to push PO fluids and electrolytes,   All questions answered. The patient is in agreement with the POC.     Differential diagnosis, natural history, and supportive care discussed. AVS handout given and reviewed with patient. Patient educated on red flags and when to seek treatment back in ED or UC.     I considered other causes of pharyngitis including Group C, G strep, peritonsillar abscess, Britton's angina, and retropharyngeal abscess but the patient's reported symptoms and my exam do not support these alternative diagnosis based on information I have available today.  This may change and I encouraged the patient to return to clinic if they are experiencing new symptoms or their symptoms fail to resolve with time as we cannot rule out all pathology from a single Urgent Care visit.    I personally reviewed prior external notes and test results pertinent to today's visit.  I have independently reviewed and interpreted all diagnostics ordered during this urgent care visit.     This dictation has  been created using voice recognition software. The accuracy of the dictation is limited by the abilities of the software. I expect there may be some errors of grammar and possibly content. I made every attempt to manually correct the errors within my dictation. However, errors related to voice recognition software may still exist and should be interpreted within the appropriate context.    This note was electronically signed by JOHANNA Lambert

## 2023-03-23 NOTE — PATIENT INSTRUCTIONS
Symptom management for cough, congestion, and pharyngitis include warm salt water gargles, over-the-counter throat sprays, rest, hydration with frozen (eg, ice or popsicles) or warmed liquids, herbal tea containing licorice root, elm inner bark, marshmallow root, and licorice root aqueous dry extract, Tylenol/Ibuprofen for pain control, Cepacol lozenges, soft diet, honey, zinc, elderberry, and cool mist humidification.

## 2023-06-15 SDOH — ECONOMIC STABILITY: HOUSING INSECURITY: IN THE LAST 12 MONTHS, HOW MANY PLACES HAVE YOU LIVED?: 1

## 2023-06-15 SDOH — ECONOMIC STABILITY: INCOME INSECURITY: HOW HARD IS IT FOR YOU TO PAY FOR THE VERY BASICS LIKE FOOD, HOUSING, MEDICAL CARE, AND HEATING?: SOMEWHAT HARD

## 2023-06-15 SDOH — ECONOMIC STABILITY: HOUSING INSECURITY
IN THE LAST 12 MONTHS, WAS THERE A TIME WHEN YOU DID NOT HAVE A STEADY PLACE TO SLEEP OR SLEPT IN A SHELTER (INCLUDING NOW)?: NO

## 2023-06-15 SDOH — HEALTH STABILITY: PHYSICAL HEALTH: ON AVERAGE, HOW MANY DAYS PER WEEK DO YOU ENGAGE IN MODERATE TO STRENUOUS EXERCISE (LIKE A BRISK WALK)?: 4 DAYS

## 2023-06-15 SDOH — ECONOMIC STABILITY: TRANSPORTATION INSECURITY
IN THE PAST 12 MONTHS, HAS LACK OF RELIABLE TRANSPORTATION KEPT YOU FROM MEDICAL APPOINTMENTS, MEETINGS, WORK OR FROM GETTING THINGS NEEDED FOR DAILY LIVING?: NO

## 2023-06-15 SDOH — ECONOMIC STABILITY: FOOD INSECURITY: WITHIN THE PAST 12 MONTHS, THE FOOD YOU BOUGHT JUST DIDN'T LAST AND YOU DIDN'T HAVE MONEY TO GET MORE.: NEVER TRUE

## 2023-06-15 SDOH — ECONOMIC STABILITY: INCOME INSECURITY: IN THE LAST 12 MONTHS, WAS THERE A TIME WHEN YOU WERE NOT ABLE TO PAY THE MORTGAGE OR RENT ON TIME?: YES

## 2023-06-15 SDOH — ECONOMIC STABILITY: HOUSING INSECURITY
IN THE LAST 12 MONTHS, WAS THERE A TIME WHEN YOU DID NOT HAVE A STEADY PLACE TO SLEEP OR SLEPT IN A SHELTER (INCLUDING NOW)?: YES

## 2023-06-15 SDOH — ECONOMIC STABILITY: FOOD INSECURITY: WITHIN THE PAST 12 MONTHS, YOU WORRIED THAT YOUR FOOD WOULD RUN OUT BEFORE YOU GOT MONEY TO BUY MORE.: SOMETIMES TRUE

## 2023-06-15 SDOH — HEALTH STABILITY: PHYSICAL HEALTH: ON AVERAGE, HOW MANY MINUTES DO YOU ENGAGE IN EXERCISE AT THIS LEVEL?: 30 MIN

## 2023-06-15 SDOH — ECONOMIC STABILITY: TRANSPORTATION INSECURITY
IN THE PAST 12 MONTHS, HAS THE LACK OF TRANSPORTATION KEPT YOU FROM MEDICAL APPOINTMENTS OR FROM GETTING MEDICATIONS?: NO

## 2023-06-15 SDOH — ECONOMIC STABILITY: TRANSPORTATION INSECURITY
IN THE PAST 12 MONTHS, HAS LACK OF TRANSPORTATION KEPT YOU FROM MEETINGS, WORK, OR FROM GETTING THINGS NEEDED FOR DAILY LIVING?: NO

## 2023-06-15 SDOH — HEALTH STABILITY: MENTAL HEALTH
STRESS IS WHEN SOMEONE FEELS TENSE, NERVOUS, ANXIOUS, OR CAN'T SLEEP AT NIGHT BECAUSE THEIR MIND IS TROUBLED. HOW STRESSED ARE YOU?: VERY MUCH

## 2023-06-15 ASSESSMENT — SOCIAL DETERMINANTS OF HEALTH (SDOH)
HOW OFTEN DO YOU HAVE SIX OR MORE DRINKS ON ONE OCCASION: NEVER
HOW OFTEN DO YOU ATTENT MEETINGS OF THE CLUB OR ORGANIZATION YOU BELONG TO?: PATIENT DECLINED
HOW OFTEN DO YOU ATTENT MEETINGS OF THE CLUB OR ORGANIZATION YOU BELONG TO?: PATIENT DECLINED
IN A TYPICAL WEEK, HOW MANY TIMES DO YOU TALK ON THE PHONE WITH FAMILY, FRIENDS, OR NEIGHBORS?: TWICE A WEEK
WITHIN THE PAST 12 MONTHS, YOU WORRIED THAT YOUR FOOD WOULD RUN OUT BEFORE YOU GOT THE MONEY TO BUY MORE: SOMETIMES TRUE
HOW OFTEN DO YOU HAVE A DRINK CONTAINING ALCOHOL: NEVER
DO YOU BELONG TO ANY CLUBS OR ORGANIZATIONS SUCH AS CHURCH GROUPS UNIONS, FRATERNAL OR ATHLETIC GROUPS, OR SCHOOL GROUPS?: NO
ARE YOU MARRIED, WIDOWED, DIVORCED, SEPARATED, NEVER MARRIED, OR LIVING WITH A PARTNER?: LIVING WITH PARTNER
HOW MANY DRINKS CONTAINING ALCOHOL DO YOU HAVE ON A TYPICAL DAY WHEN YOU ARE DRINKING: PATIENT DOES NOT DRINK
ARE YOU MARRIED, WIDOWED, DIVORCED, SEPARATED, NEVER MARRIED, OR LIVING WITH A PARTNER?: LIVING WITH PARTNER
DO YOU BELONG TO ANY CLUBS OR ORGANIZATIONS SUCH AS CHURCH GROUPS UNIONS, FRATERNAL OR ATHLETIC GROUPS, OR SCHOOL GROUPS?: NO
HOW OFTEN DO YOU GET TOGETHER WITH FRIENDS OR RELATIVES?: NEVER
HOW OFTEN DO YOU ATTEND CHURCH OR RELIGIOUS SERVICES?: NEVER
HOW OFTEN DO YOU ATTEND CHURCH OR RELIGIOUS SERVICES?: NEVER
HOW HARD IS IT FOR YOU TO PAY FOR THE VERY BASICS LIKE FOOD, HOUSING, MEDICAL CARE, AND HEATING?: SOMEWHAT HARD
IN A TYPICAL WEEK, HOW MANY TIMES DO YOU TALK ON THE PHONE WITH FAMILY, FRIENDS, OR NEIGHBORS?: TWICE A WEEK
HOW OFTEN DO YOU GET TOGETHER WITH FRIENDS OR RELATIVES?: NEVER

## 2023-06-15 ASSESSMENT — LIFESTYLE VARIABLES
HOW MANY STANDARD DRINKS CONTAINING ALCOHOL DO YOU HAVE ON A TYPICAL DAY: PATIENT DOES NOT DRINK
SKIP TO QUESTIONS 9-10: 1
HOW OFTEN DO YOU HAVE SIX OR MORE DRINKS ON ONE OCCASION: NEVER
HOW OFTEN DO YOU HAVE A DRINK CONTAINING ALCOHOL: NEVER
AUDIT-C TOTAL SCORE: 0

## 2023-06-16 ENCOUNTER — OFFICE VISIT (OUTPATIENT)
Dept: MEDICAL GROUP | Facility: MEDICAL CENTER | Age: 33
End: 2023-06-16
Attending: FAMILY MEDICINE
Payer: MEDICAID

## 2023-06-16 VITALS
HEART RATE: 92 BPM | DIASTOLIC BLOOD PRESSURE: 56 MMHG | BODY MASS INDEX: 38.14 KG/M2 | TEMPERATURE: 97 F | HEIGHT: 67 IN | SYSTOLIC BLOOD PRESSURE: 98 MMHG | RESPIRATION RATE: 16 BRPM | OXYGEN SATURATION: 98 % | WEIGHT: 243 LBS

## 2023-06-16 DIAGNOSIS — G89.29 CHRONIC THORACIC BACK PAIN, UNSPECIFIED BACK PAIN LATERALITY: ICD-10-CM

## 2023-06-16 DIAGNOSIS — Z83.49 FAMILY HISTORY OF HASHIMOTO THYROIDITIS: ICD-10-CM

## 2023-06-16 DIAGNOSIS — F41.9 ANXIETY AND DEPRESSION: ICD-10-CM

## 2023-06-16 DIAGNOSIS — F32.A ANXIETY AND DEPRESSION: ICD-10-CM

## 2023-06-16 DIAGNOSIS — M54.6 CHRONIC THORACIC BACK PAIN, UNSPECIFIED BACK PAIN LATERALITY: ICD-10-CM

## 2023-06-16 DIAGNOSIS — Z11.3 SCREEN FOR STD (SEXUALLY TRANSMITTED DISEASE): ICD-10-CM

## 2023-06-16 DIAGNOSIS — Z01.419 ENCOUNTER FOR WELL WOMAN EXAM WITH ROUTINE GYNECOLOGICAL EXAM: ICD-10-CM

## 2023-06-16 DIAGNOSIS — Z13.6 SCREENING FOR CARDIOVASCULAR CONDITION: ICD-10-CM

## 2023-06-16 DIAGNOSIS — J32.9 CHRONIC SINUSITIS, UNSPECIFIED LOCATION: ICD-10-CM

## 2023-06-16 PROBLEM — O09.93 SUPERVISION OF HIGH RISK PREGNANCY IN THIRD TRIMESTER: Status: RESOLVED | Noted: 2017-10-06 | Resolved: 2023-06-16

## 2023-06-16 PROBLEM — O09.891 HISTORY OF PRETERM DELIVERY, CURRENTLY PREGNANT IN FIRST TRIMESTER: Status: RESOLVED | Noted: 2017-10-06 | Resolved: 2023-06-16

## 2023-06-16 PROBLEM — G89.18 POST-OP PAIN: Status: RESOLVED | Noted: 2018-04-14 | Resolved: 2023-06-16

## 2023-06-16 PROCEDURE — 99214 OFFICE O/P EST MOD 30 MIN: CPT | Performed by: FAMILY MEDICINE

## 2023-06-16 PROCEDURE — 3074F SYST BP LT 130 MM HG: CPT | Performed by: FAMILY MEDICINE

## 2023-06-16 PROCEDURE — 3078F DIAST BP <80 MM HG: CPT | Performed by: FAMILY MEDICINE

## 2023-06-16 PROCEDURE — 99213 OFFICE O/P EST LOW 20 MIN: CPT | Performed by: FAMILY MEDICINE

## 2023-06-16 ASSESSMENT — PATIENT HEALTH QUESTIONNAIRE - PHQ9
CLINICAL INTERPRETATION OF PHQ2 SCORE: 5
SUM OF ALL RESPONSES TO PHQ QUESTIONS 1-9: 18
5. POOR APPETITE OR OVEREATING: 3 - NEARLY EVERY DAY

## 2023-06-16 ASSESSMENT — ANXIETY QUESTIONNAIRES
4. TROUBLE RELAXING: NEARLY EVERY DAY
2. NOT BEING ABLE TO STOP OR CONTROL WORRYING: NEARLY EVERY DAY
IF YOU CHECKED OFF ANY PROBLEMS ON THIS QUESTIONNAIRE, HOW DIFFICULT HAVE THESE PROBLEMS MADE IT FOR YOU TO DO YOUR WORK, TAKE CARE OF THINGS AT HOME, OR GET ALONG WITH OTHER PEOPLE: SOMEWHAT DIFFICULT
3. WORRYING TOO MUCH ABOUT DIFFERENT THINGS: NEARLY EVERY DAY
1. FEELING NERVOUS, ANXIOUS, OR ON EDGE: NEARLY EVERY DAY
7. FEELING AFRAID AS IF SOMETHING AWFUL MIGHT HAPPEN: NEARLY EVERY DAY
GAD7 TOTAL SCORE: 21
5. BEING SO RESTLESS THAT IT IS HARD TO SIT STILL: NEARLY EVERY DAY
6. BECOMING EASILY ANNOYED OR IRRITABLE: NEARLY EVERY DAY

## 2023-06-16 NOTE — ASSESSMENT & PLAN NOTE
Formally diagnosed with depression at age 17 and treated with Zoloft for about 6 months and stopped it because it was ineffective.  Anxiety diagnosis came along after. Previously received psychiatric care from [facility]. Previously was on Celexa for about a year consistently, but found it ineffective. Also did not respond to Prozac because of adverse effects. Each medication she was taking consistently for at least 6 months. It has been 5 years since she has been on any psychiatric medications.

## 2023-06-16 NOTE — PROGRESS NOTES
Subjective:     CC:    Chief Complaint   Patient presents with    Establish Care     referrals    Depression       HISTORY OF THE PRESENT ILLNESS: Patient is a 32 y.o. female. This pleasant patient is here today to establish primary care with me and discuss the following issues.   Denies any routine primary adult care    Specialists   Therapy- Ros[facility] at [facility] in [place]    Family history of Hashimoto thyroiditis  Sister from paternal side and other members of paternal side     Anxiety and depression  Formally diagnosed with depression at age 17 and treated with Zoloft for about 6 months and stopped it because it was ineffective.  Anxiety diagnosis came along after. Previously received psychiatric care from [facility]. Previously was on Celexa for about a year consistently, but found it ineffective. Also did not respond to Prozac because of adverse effects. Each medication she was taking consistently for at least 6 months. It has been 5 years since she has been on any psychiatric medications.    Chronic sinusitis  History of recurrent sinus infections- suffers from chronic nasal congestion, +PND. She takes sudafed and flonase which she has been using consistently for past 2 months. In past has used nasal saline rinse.    Chronic thoracic back pain  Has been ongoing for past few years and is associated to her enlarged breasts. Has tried heat therapy, massage, ibuprofen, back stretches.      Allergies: Patient has no known allergies.      [id number] - [place], [place] - [address] [place] [address] & [place]  [address]  [facility] [address]  Phone: [phone] Fax: [phone]      Current Outpatient Medications   Medication Sig Dispense Refill    ibuprofen (MOTRIN) 800 MG Tab Take 1 Tab by mouth every 8 hours as needed (For cramping after delivery; do not give if patient is receiving ketorolac (Toradol)). 30 Tab 2     No current facility-administered  medications for this visit.       Past Medical History:   Diagnosis Date    Anxiety     Depression     [facility] at 28wk [facility]    Supervision of high risk pregnancy in third trimester 10/6/2017       Past Surgical History:   Procedure Laterality Date    REPEAT C SECTION W TUBAL LIGATION  2018    Procedure: REPEAT C SECTION WITH TUBAL LIGATION;  Surgeon: Chelsea High M.D.;  Location: LABOR AND DELIVERY;  Service: Obstetrics    PRIMARY C SECTION  10/5/2016    Procedure: PRIMARY C SECTION;  Surgeon: Margarette Arriaga M.D.;  Location: LABOR AND DELIVERY;  Service:        Social History     Tobacco Use    Smoking status: Former     Packs/day: 0.25     Years: 3.00     Pack years: 0.75     Types: Cigarettes     Quit date: 2016     Years since quittin.2    Smokeless tobacco: Never   Vaping Use    Vaping Use: Never used   Substance Use Topics    Alcohol use: Not Currently     Comment: previous rare social alcohol use    Drug use: Never       Family History   Problem Relation Age of Onset    No Known Problems Mother     No Known Problems Father     Cancer Maternal Grandmother     Diabetes Maternal Grandfather     Cancer Paternal Grandmother         breast cancer    Diabetes Paternal Grandfather      Depression Screening    Little interest or pleasure in doing things?  3 - nearly every day   Feeling down, depressed , or hopeless? 2 - more than half the days   Trouble falling or staying asleep, or sleeping too much?  1 - several days   Feeling tired or having little energy?  3 - nearly every day   Poor appetite or overeating?  3 - nearly every day   Feeling bad about yourself - or that you are a failure or have let yourself or your family down? 3 - nearly every day   Trouble concentrating on things, such as reading the newspaper or watching television? 3 - nearly every day   Moving or speaking so slowly that other people could have noticed.  Or the opposite - being so  "fidgety or restless that you have been moving around a lot more than usual?  0 - not at all   Thoughts that you would be better off dead, or of hurting yourself?  0 - not at all   Patient Health Questionnaire Score: 18       If depressive symptoms identified deferred to follow up visit unless specifically addressed in assesment and plan.    Interpretation of PHQ-9 Total Score   Score Severity   1-4 No Depression   5-9 Mild Depression   10-14 Moderate Depression   15-19 Moderately Severe Depression   20-27 Severe Depression    ROSE-7 Questionnaire    Feeling nervous, anxious, or on edge: Nearly every day  Not being able to sop or control worrying: Nearly every day  Worrying too much about different things: Nearly every day  Trouble relaxing: Nearly every day  Being so restless that it's hard to sit still: Nearly every day  Becoming easily annoyed or irritable: Nearly every day  Feeling afraid as if something awful might happen: Nearly every day  Total: 21    Interpretation of ROSE 7 Total Score   Score Severity :  0-4 No Anxiety   5-9 Mild Anxiety  10-14 Moderate Anxiety  15-21 Severe Anxiety      Objective:     BP 98/56   Pulse 92   Temp 36.1 °C (97 °F)   Resp 16   Ht 1.702 m (5' 7.01\")   Wt 110 kg (243 lb)   SpO2 98%   BMI 38.05 kg/m²   Physical Exam  Constitutional: Alert, no distress  Skin: No rashes in visible areas  Eye: Conjunctiva clear, lids normalDictation #1  MRN:[id number]  CSN:[id number]   Respiratory: Unlabored respiratory effort, no cough  MSK: Normal gait, moves all extremities  Psych: Alert and oriented x3, normal affect and mood      Assessment & Plan:   32 y.o. female with the following -    1. Anxiety and depression  - Chronic condition; uncontrolled. No apparent safety threat to self or others  - Referral to Behavioral Health for further evaluation and management considerations given non-response to previously tried first line therapies  - F/u with me in four weeks or sooner as needed  - ER for " SI/SA/HI    2. Family history of Hashimoto thyroiditis  - Given uncontrolled mood disorder also recommend rule out for thyroid disease  - THYROID PANEL WITH TSH    3. Encounter for well woman exam with routine gynecological exam  - Referral to Gynecology to re-establish care    4. Chronic sinusitis, unspecified location  - Given chronicity, recommend further evaluation with CT and consultation with specialist  - Encouraged to avoid long term use of Sudafed; ok to continue with nasal ICS  - CT-LOCALIZATION LIMITED SINUSES; Future  - Referral to ENT  - Comp Metabolic Panel; Future  - CBC WITH DIFFERENTIAL; Future    5. Chronic thoracic back pain, unspecified back pain laterality  - Long discussion with patient about treatment strategies and goals as pain attributed mainly to hypertrophied breasts  - Goal of 10% weight loss over 6 months may help improve symptoms and can  cardiovascular risk by up to 25%.  - Discussed importance of healthy diet, particularly whole food, plant based and use of intermittent fasting to help with weight loss.    - - Referral to Physical Therapy; discussed importance of regular exercise (5x/week, 20-30 minutes of sustained cardiovascular training)  - Additional resources and recommendations sent via secure messaging  - Weight loss medication can be considered if no progress evident following lifestyle measures as above for at least 3-6 months   - Follow up labs to r/o cormorbidities  - Continue with conservative management with NSAIDS and proper fitting bras   - Pt verbalized understanding and acknowledged treatment plan.    6. Screen for STD (sexually transmitted disease)  - HIV AG/AB COMBO ASSAY SCREENING; Future  - HEP C VIRUS ANTIBODY; Future    7. Screening for cardiovascular condition  - HEMOGLOBIN A1C; Future  - Lipid Profile; Future       Return in about 4 weeks (around 2023) for chronic condition follow up, lab follow up.    Please note that this dictation was created  using voice recognition software. I have made every reasonable attempt to correct obvious errors, but I expect that there are errors of grammar and possibly content that I did not discover before finalizing the note.

## 2023-06-16 NOTE — ASSESSMENT & PLAN NOTE
Has been ongoing for past few years and is associated to her enlarged breasts. Has tried heat therapy, massage, ibuprofen, back stretches.

## 2023-06-16 NOTE — ASSESSMENT & PLAN NOTE
History of recurrent sinus infections- suffers from chronic nasal congestion, +PND. She takes sudafed and flonase which she has been using consistently for past 2 months. In past has used nasal saline rinse.

## 2023-07-07 DIAGNOSIS — F41.9 ANXIETY AND DEPRESSION: ICD-10-CM

## 2023-07-07 DIAGNOSIS — F32.A ANXIETY AND DEPRESSION: ICD-10-CM

## 2023-07-10 ENCOUNTER — HOSPITAL ENCOUNTER (OUTPATIENT)
Dept: RADIOLOGY | Facility: MEDICAL CENTER | Age: 33
End: 2023-07-10
Attending: FAMILY MEDICINE
Payer: COMMERCIAL

## 2023-07-10 DIAGNOSIS — J32.9 CHRONIC SINUSITIS, UNSPECIFIED LOCATION: ICD-10-CM

## 2023-07-10 PROCEDURE — 70486 CT MAXILLOFACIAL W/O DYE: CPT

## 2023-09-20 ENCOUNTER — APPOINTMENT (OUTPATIENT)
Dept: BEHAVIORAL HEALTH | Facility: CLINIC | Age: 33
End: 2023-09-20
Payer: COMMERCIAL

## 2023-10-06 ENCOUNTER — TELEMEDICINE (OUTPATIENT)
Dept: BEHAVIORAL HEALTH | Facility: CLINIC | Age: 33
End: 2023-10-06
Payer: COMMERCIAL

## 2023-10-06 DIAGNOSIS — F51.05 INSOMNIA DUE TO OTHER MENTAL DISORDER: ICD-10-CM

## 2023-10-06 DIAGNOSIS — F41.1 GENERALIZED ANXIETY DISORDER: ICD-10-CM

## 2023-10-06 DIAGNOSIS — F39 UNSPECIFIED MOOD (AFFECTIVE) DISORDER (HCC): ICD-10-CM

## 2023-10-06 DIAGNOSIS — F99 INSOMNIA DUE TO OTHER MENTAL DISORDER: ICD-10-CM

## 2023-10-06 PROBLEM — F41.9 ANXIETY AND DEPRESSION: Status: RESOLVED | Noted: 2023-06-16 | Resolved: 2023-10-06

## 2023-10-06 PROBLEM — F32.A ANXIETY AND DEPRESSION: Status: RESOLVED | Noted: 2023-06-16 | Resolved: 2023-10-06

## 2023-10-06 PROCEDURE — 99204 OFFICE O/P NEW MOD 45 MIN: CPT | Mod: GT,GC | Performed by: PSYCHIATRY & NEUROLOGY

## 2023-10-06 RX ORDER — SERTRALINE HYDROCHLORIDE 25 MG/1
TABLET, FILM COATED ORAL
Qty: 39 TABLET | Refills: 2 | Status: SHIPPED | OUTPATIENT
Start: 2023-10-06 | End: 2023-11-05

## 2023-10-06 RX ORDER — HYDROXYZINE HYDROCHLORIDE 10 MG/1
10-20 TABLET, FILM COATED ORAL 3 TIMES DAILY PRN
Qty: 180 TABLET | Refills: 2 | Status: SHIPPED | OUTPATIENT
Start: 2023-10-06
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
## 2024-01-22 ENCOUNTER — OFFICE VISIT (OUTPATIENT)
Dept: BEHAVIORAL HEALTH | Facility: CLINIC | Age: 34
End: 2024-01-22
Payer: COMMERCIAL

## 2024-01-22 DIAGNOSIS — R41.840 INATTENTION: ICD-10-CM

## 2024-01-22 DIAGNOSIS — F99 INSOMNIA DUE TO OTHER MENTAL DISORDER: ICD-10-CM

## 2024-01-22 DIAGNOSIS — F51.05 INSOMNIA DUE TO OTHER MENTAL DISORDER: ICD-10-CM

## 2024-01-22 DIAGNOSIS — F34.1 PERSISTENT DEPRESSIVE DISORDER: ICD-10-CM

## 2024-01-22 DIAGNOSIS — F41.1 GENERALIZED ANXIETY DISORDER: ICD-10-CM

## 2024-01-22 PROCEDURE — 99214 OFFICE O/P EST MOD 30 MIN: CPT | Mod: GC | Performed by: PSYCHIATRY & NEUROLOGY

## 2024-01-22 RX ORDER — BUPROPION HYDROCHLORIDE 150 MG/1
150 TABLET ORAL EVERY MORNING
Qty: 30 TABLET | Refills: 3 | Status: SHIPPED | OUTPATIENT
Start: 2024-01-22
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
## 2024-01-23 PROBLEM — F34.1 PERSISTENT DEPRESSIVE DISORDER: Status: ACTIVE | Noted: 2024-01-23

## 2024-03-04 ENCOUNTER — APPOINTMENT (OUTPATIENT)
Dept: BEHAVIORAL HEALTH | Facility: CLINIC | Age: 34
End: 2024-03-04
Payer: COMMERCIAL

## 2024-12-16 ENCOUNTER — OFFICE VISIT (OUTPATIENT)
Dept: URGENT CARE | Facility: CLINIC | Age: 34
End: 2024-12-16
Payer: COMMERCIAL

## 2024-12-16 VITALS
WEIGHT: 253.31 LBS | HEIGHT: 67 IN | RESPIRATION RATE: 14 BRPM | BODY MASS INDEX: 39.76 KG/M2 | TEMPERATURE: 97.6 F | OXYGEN SATURATION: 99 % | DIASTOLIC BLOOD PRESSURE: 72 MMHG | SYSTOLIC BLOOD PRESSURE: 116 MMHG | HEART RATE: 72 BPM

## 2024-12-16 DIAGNOSIS — R09.81 NASAL CONGESTION: ICD-10-CM

## 2024-12-16 DIAGNOSIS — R05.1 ACUTE COUGH: ICD-10-CM

## 2024-12-16 DIAGNOSIS — H10.32 ACUTE BACTERIAL CONJUNCTIVITIS OF LEFT EYE: ICD-10-CM

## 2024-12-16 DIAGNOSIS — H66.002 NON-RECURRENT ACUTE SUPPURATIVE OTITIS MEDIA OF LEFT EAR WITHOUT SPONTANEOUS RUPTURE OF TYMPANIC MEMBRANE: ICD-10-CM

## 2024-12-16 DIAGNOSIS — J01.90 ACUTE NON-RECURRENT SINUSITIS, UNSPECIFIED LOCATION: ICD-10-CM

## 2024-12-16 PROCEDURE — 3074F SYST BP LT 130 MM HG: CPT | Performed by: NURSE PRACTITIONER

## 2024-12-16 PROCEDURE — 99213 OFFICE O/P EST LOW 20 MIN: CPT | Performed by: NURSE PRACTITIONER

## 2024-12-16 PROCEDURE — 3078F DIAST BP <80 MM HG: CPT | Performed by: NURSE PRACTITIONER

## 2024-12-16 RX ORDER — PREDNISONE 20 MG/1
TABLET ORAL
Qty: 11 TABLET | Refills: 0 | Status: SHIPPED | OUTPATIENT
Start: 2024-12-16 | End: 2024-12-21

## 2024-12-16 RX ORDER — POLYMYXIN B SULFATE AND TRIMETHOPRIM 1; 10000 MG/ML; [USP'U]/ML
1 SOLUTION OPHTHALMIC EVERY 4 HOURS
Qty: 10 ML | Refills: 0 | Status: SHIPPED | OUTPATIENT
Start: 2024-12-16 | End: 2024-12-23

## 2024-12-16 RX ORDER — DEXTROMETHORPHAN HYDROBROMIDE AND PROMETHAZINE HYDROCHLORIDE 15; 6.25 MG/5ML; MG/5ML
5 SYRUP ORAL EVERY 4 HOURS PRN
Qty: 120 ML | Refills: 0 | Status: SHIPPED | OUTPATIENT
Start: 2024-12-16

## 2024-12-16 ASSESSMENT — ENCOUNTER SYMPTOMS
COUGH: 1
CHILLS: 1
EYE PAIN: 1
EYE DISCHARGE: 1
FEVER: 1
SORE THROAT: 1
INSOMNIA: 1
EYE REDNESS: 1
BLURRED VISION: 0
SHORTNESS OF BREATH: 0

## 2024-12-16 ASSESSMENT — VISUAL ACUITY: OU: 1

## 2024-12-16 NOTE — PROGRESS NOTES
Subjective:     Marie Poon is a 34 y.o. female who presents for Eye Problem (L eye, redness and swollen), Pharyngitis, and Otalgia (L ear)       Eye Problem   The left eye is affected. This is a new problem. The problem has been gradually worsening. There was no injury mechanism. Associated symptoms include an eye discharge, eye redness and a fever. Pertinent negatives include no blurred vision.   Pharyngitis   This is a new problem. The problem has been gradually worsening. Associated symptoms include coughing and ear pain. Pertinent negatives include no shortness of breath.   Otalgia   There is pain in the left ear. This is a new problem. The problem has been gradually worsening. Associated symptoms include coughing and a sore throat.     Ambient listening software ([employer]) used during this encounter with the consent of the patient. The following text is AI generated:    History of Present Illness  The patient presents with left eye redness, sore throat, (left) ear pain, and sinus infection.    Left Eye Redness  She reports left eye pruritus, discomfort, and ocular discharge since this morning.  - Onset: Since this morning.  - Location: Left eye.  - Character: Pruritus, discomfort, and ocular discharge.    Sore Throat and Sinus Infection  She has been using ibuprofen and Mucinex for significant phlegm. She reports a mild cough, severe right-sided throat pain, fever, and chills starting Wednesday, now resolved. She attributes her cough to postnasal drainage and reports frequent awakenings due to breathing difficulty while lying down.  - Onset: Sore throat last Tuesday, progressing to a cold by Wednesday.  - Location: Right-sided throat pain.  - Duration: Persistent throat discomfort since last Tuesday.  - Character: Mild cough, severe throat pain, fever, and chills (now resolved).  - Alleviating/Aggravating Factors: Using ibuprofen and Mucinex for phlegm.  - Timing: Frequent awakenings due to  breathing difficulty while lying down.    Ear Pain and Nasal Congestion  This morning, she woke up with severe ear pain, diminished hearing, and mild nasal congestion.  - Onset: This morning.  - Location: Ear and nasal area.  - Character: Severe ear pain, diminished hearing, and mild nasal congestion.    MEDICATIONS  - Current: ibuprofen  - Current: Mucinex    Review of Systems   Constitutional:  Positive for chills and fever.   HENT:  Positive for ear pain and sore throat.    Eyes:  Positive for pain, discharge and redness. Negative for blurred vision.   Respiratory:  Positive for cough. Negative for shortness of breath.    Psychiatric/Behavioral:  The patient has insomnia.    All other systems reviewed and are negative.    Refer to HPI for additional details.    During this visit, appropriate PPE was worn, and hand hygiene was performed.    PMH:  has a past medical history of Anxiety, Depression, History of PTD at 28wk - C/S due to NRFHT (10/6/2017), and Supervision of high risk pregnancy in third trimester (10/6/2017).    MEDS:   Current Outpatient Medications:     amoxicillin-clavulanate (AUGMENTIN) 875-125 MG Tab, Take 1 Tablet by mouth 2 times a day for 7 days., Disp: 14 Tablet, Rfl: 0    predniSONE (DELTASONE) 20 MG Tab, Take 3 tabs by mouth daily x 2 days, then 2 tabs daily x 2 days, then 1 tab on final day. Do not take with NSAIDs such as ibuprofen., Disp: 11 Tablet, Rfl: 0    promethazine-dextromethorphan (PROMETHAZINE-DM) 6.25-15 MG/5ML syrup, Take 5 mL by mouth every four hours as needed for Cough., Disp: 120 mL, Rfl: 0    polymixin-trimethoprim (POLYTRIM) 84506-8.1 UNIT/ML-% Solution, Administer 1 Drop into the left eye every 4 hours for 7 days., Disp: 10 mL, Rfl: 0    ibuprofen (MOTRIN) 800 MG Tab, Take 1 Tab by mouth every 8 hours as needed (For cramping after delivery; do not give if patient is receiving ketorolac (Toradol))., Disp: 30 Tab, Rfl: 2    ALLERGIES: No Known Allergies  SURGHX:   Past  "Surgical History:   Procedure Laterality Date    REPEAT C SECTION W TUBAL LIGATION  4/11/2018    Procedure: REPEAT C SECTION WITH TUBAL LIGATION;  Surgeon: Chelsea High M.D.;  Location: LABOR AND DELIVERY;  Service: Obstetrics    PRIMARY C SECTION  10/5/2016    Procedure: PRIMARY C SECTION;  Surgeon: Margarette Arriaga M.D.;  Location: LABOR AND DELIVERY;  Service:      SOCHX:  reports that she quit smoking about 8 years ago. Her smoking use included cigarettes. She started smoking about 11 years ago. She has a 0.8 pack-year smoking history. She has never used smokeless tobacco. She reports that she does not currently use alcohol. She reports that she does not use drugs.    FH: Per HPI as applicable/pertinent.      Objective:     /72   Pulse 72   Temp 36.4 °C (97.6 °F) (Temporal)   Resp 14   Ht 1.702 m (5' 7\")   Wt 115 kg (253 lb 4.9 oz)   SpO2 99%   BMI 39.67 kg/m²     Physical Exam  Nursing note reviewed.   Constitutional:       General: She is not in acute distress.     Appearance: She is well-developed. She is not ill-appearing or toxic-appearing.   HENT:      Head: No right periorbital erythema or left periorbital erythema.      Right Ear: Tympanic membrane is not perforated, erythematous or bulging.      Left Ear: Tympanic membrane is injected and bulging. Tympanic membrane is not perforated.      Nose: Congestion present.      Left Sinus: Maxillary sinus tenderness and frontal sinus tenderness present.      Mouth/Throat:      Mouth: Mucous membranes are moist.      Pharynx: Oropharynx is clear.   Eyes:      General: Lids are normal. Vision grossly intact.         Left eye: Discharge present.     Extraocular Movements: Extraocular movements intact.      Conjunctiva/sclera:      Left eye: Left conjunctiva is injected.      Pupils: Pupils are equal, round, and reactive to light.   Neck:      Trachea: Phonation normal.   Cardiovascular:      Rate and Rhythm: Normal rate and regular " rhythm.      Heart sounds: Normal heart sounds.   Pulmonary:      Effort: Pulmonary effort is normal. No respiratory distress.      Breath sounds: Normal breath sounds. No stridor. No decreased breath sounds, wheezing, rhonchi or rales.   Musculoskeletal:         General: No deformity. Normal range of motion.      Cervical back: Normal range of motion and neck supple.   Skin:     General: Skin is warm and dry.      Coloration: Skin is not pale.   Neurological:      Mental Status: She is alert and oriented to person, place, and time.      Motor: No weakness.   Psychiatric:         Behavior: Behavior normal. Behavior is cooperative.       Assessment/Plan:     1. Acute bacterial conjunctivitis of left eye  - polymixin-trimethoprim (POLYTRIM) 36065-7.1 UNIT/ML-% Solution; Administer 1 Drop into the left eye every 4 hours for 7 days.  Dispense: 10 mL; Refill: 0    2. Non-recurrent acute suppurative otitis media of left ear without spontaneous rupture of tympanic membrane  - amoxicillin-clavulanate (AUGMENTIN) 875-125 MG Tab; Take 1 Tablet by mouth 2 times a day for 7 days.  Dispense: 14 Tablet; Refill: 0  - predniSONE (DELTASONE) 20 MG Tab; Take 3 tabs by mouth daily x 2 days, then 2 tabs daily x 2 days, then 1 tab on final day. Do not take with NSAIDs such as ibuprofen.  Dispense: 11 Tablet; Refill: 0    3. Acute non-recurrent sinusitis, unspecified location  - amoxicillin-clavulanate (AUGMENTIN) 875-125 MG Tab; Take 1 Tablet by mouth 2 times a day for 7 days.  Dispense: 14 Tablet; Refill: 0  - predniSONE (DELTASONE) 20 MG Tab; Take 3 tabs by mouth daily x 2 days, then 2 tabs daily x 2 days, then 1 tab on final day. Do not take with NSAIDs such as ibuprofen.  Dispense: 11 Tablet; Refill: 0    4. Nasal congestion  - promethazine-dextromethorphan (PROMETHAZINE-DM) 6.25-15 MG/5ML syrup; Take 5 mL by mouth every four hours as needed for Cough.  Dispense: 120 mL; Refill: 0    5. Acute cough  - promethazine-dextromethorphan  (PROMETHAZINE-DM) 6.25-15 MG/5ML syrup; Take 5 mL by mouth every four hours as needed for Cough.  Dispense: 120 mL; Refill: 0    Rx as above sent electronically.     Emphasize supportive measures and symptom management with over-the-counter medication as needed such as Sudafed.    Monitor. Follow up in 3-5 days if symptoms do not improve or sooner if symptoms change or worsen.     Differential diagnosis, natural history, supportive care, over-the-counter symptom management per 's instructions, close monitoring, and indications for immediate follow-up discussed.     All questions answered. Patient agrees with the plan of care.    Discharge summary provided via [employer].    UDPATE: Patient called and reported worsening left ear pain as well as decreased hearing.  Reports that pharmacy is estimating another 20 minutes before she can  her prescriptions.  Advised to start prednisone once picked up.  For now, start acetaminophen extra strength 1000 mg.  Start OTC pseudoephedrine.  If symptoms continue to worsen or do not improve, advised to return in person for reevaluation.

## 2024-12-18 ENCOUNTER — PATIENT MESSAGE (OUTPATIENT)
Dept: URGENT CARE | Facility: CLINIC | Age: 34
End: 2024-12-18
Payer: COMMERCIAL

## 2024-12-18 ENCOUNTER — TELEPHONE (OUTPATIENT)
Dept: RADIOLOGY | Facility: IMAGING CENTER | Age: 34
End: 2024-12-18
Payer: COMMERCIAL

## 2024-12-18 DIAGNOSIS — H66.002 NON-RECURRENT ACUTE SUPPURATIVE OTITIS MEDIA OF LEFT EAR WITHOUT SPONTANEOUS RUPTURE OF TYMPANIC MEMBRANE: ICD-10-CM

## 2024-12-18 DIAGNOSIS — H92.02 OTALGIA, LEFT: ICD-10-CM

## 2024-12-18 DIAGNOSIS — H93.12 TINNITUS OF LEFT EAR: ICD-10-CM

## 2024-12-18 DIAGNOSIS — J01.90 ACUTE NON-RECURRENT SINUSITIS, UNSPECIFIED LOCATION: ICD-10-CM

## 2024-12-18 DIAGNOSIS — H60.502 ACUTE OTITIS EXTERNA OF LEFT EAR, UNSPECIFIED TYPE: ICD-10-CM

## 2024-12-18 RX ORDER — NEOMYCIN SULFATE, POLYMYXIN B SULFATE, HYDROCORTISONE 3.5; 10000; 1 MG/ML; [USP'U]/ML; MG/ML
4 SOLUTION/ DROPS AURICULAR (OTIC) 4 TIMES DAILY
Qty: 10 ML | Refills: 0 | Status: SHIPPED | OUTPATIENT
Start: 2024-12-18 | End: 2024-12-21

## 2024-12-21 ENCOUNTER — OFFICE VISIT (OUTPATIENT)
Dept: URGENT CARE | Facility: PHYSICIAN GROUP | Age: 34
End: 2024-12-21
Payer: COMMERCIAL

## 2024-12-21 VITALS
WEIGHT: 258.6 LBS | SYSTOLIC BLOOD PRESSURE: 126 MMHG | HEART RATE: 84 BPM | DIASTOLIC BLOOD PRESSURE: 72 MMHG | OXYGEN SATURATION: 97 % | TEMPERATURE: 97.5 F | HEIGHT: 67 IN | RESPIRATION RATE: 14 BRPM | BODY MASS INDEX: 40.59 KG/M2

## 2024-12-21 DIAGNOSIS — J01.90 ACUTE BACTERIAL SINUSITIS: ICD-10-CM

## 2024-12-21 DIAGNOSIS — B96.89 ACUTE BACTERIAL SINUSITIS: ICD-10-CM

## 2024-12-21 DIAGNOSIS — H66.92 ACUTE LEFT OTITIS MEDIA: ICD-10-CM

## 2024-12-21 PROCEDURE — 3078F DIAST BP <80 MM HG: CPT | Performed by: FAMILY MEDICINE

## 2024-12-21 PROCEDURE — 99214 OFFICE O/P EST MOD 30 MIN: CPT | Performed by: FAMILY MEDICINE

## 2024-12-21 PROCEDURE — 3074F SYST BP LT 130 MM HG: CPT | Performed by: FAMILY MEDICINE

## 2024-12-21 RX ORDER — LEVOFLOXACIN 500 MG/1
TABLET, FILM COATED ORAL
Qty: 7 TABLET | Refills: 0 | Status: SHIPPED | OUTPATIENT
Start: 2024-12-21 | End: 2024-12-28

## 2024-12-21 NOTE — PROGRESS NOTES
Chief Complaint:    Chief Complaint   Patient presents with    Otalgia     Pt states she was seen  at [facility] for pink eye and ear pain. Ear pain has not subsided, progressively gotten worse.       History of Present Illness:    Saw other provider on 24, was prescribed Augmentin 875 mg BID x 7 days, tapered Prednisone course x 5 days, Polytrim eye drops, and Promethazine-DM syrup, visit reviewed. Her left ear was not getting better, so she was prescribed Cortisporin Otic on 24 by other provider. The left ear pain is not improving despite all this. Her left eye conjunctivitis is improving with the antibiotic eye drops. She has nasal symptoms with purulent mucus from nose. She has finished Prednisone. She has no significant history of ear problems.      Past Medical History:    Past Medical History:   Diagnosis Date    Anxiety     Depression     History of PTD at 28wk - C/S due to [facility] 10/6/2017    Supervision of high risk pregnancy in third trimester 10/6/2017     Past Surgical History:    Past Surgical History:   Procedure Laterality Date    REPEAT C SECTION W TUBAL LIGATION  2018    Procedure: REPEAT C SECTION WITH TUBAL LIGATION;  Surgeon: Chelsea High M.D.;  Location: LABOR AND DELIVERY;  Service: Obstetrics    PRIMARY C SECTION  10/5/2016    Procedure: PRIMARY C SECTION;  Surgeon: Margarette Arriaga M.D.;  Location: LABOR AND DELIVERY;  Service:      Social History:    Social History     Socioeconomic History    Marital status: Single     Spouse name: Not on file    Number of children: 2    Years of education: Not on file    Highest education level: 12th grade   Occupational History     Comment: Works FT as    Tobacco Use    Smoking status: Former     Current packs/day: 0.00     Average packs/day: 0.3 packs/day for 3.0 years (0.8 ttl pk-yrs)     Types: Cigarettes     Start date: 2013     Quit date: 2016     Years since quittin.7    Smokeless  tobacco: Never   Vaping Use    Vaping status: Never Used   Substance and Sexual Activity    Alcohol use: Not Currently     Comment: previous rare social alcohol use    Drug use: Never    Sexual activity: Yes     Partners: Male     Birth control/protection: Surgical, Female Sterilization   Other Topics Concern    Not on file   Social History Narrative    Lives with 2 kids; 1 dog cattle dog Mrei     Social Drivers of Health     Financial Resource Strain: Medium Risk (6/15/2023)    Overall Financial Resource Strain (CARDIA)     Difficulty of Paying Living Expenses: Somewhat hard   Food Insecurity: Food Insecurity Present (6/15/2023)    Hunger Vital Sign     Worried About Running Out of Food in the Last Year: Sometimes true     Ran Out of Food in the Last Year: Never true   Transportation Needs: No Transportation Needs (6/15/2023)    PRAPARE - Transportation     Lack of Transportation (Medical): No     Lack of Transportation (Non-Medical): No   Physical Activity: Insufficiently Active (6/15/2023)    Exercise Vital Sign     Days of Exercise per Week: 4 days     Minutes of Exercise per Session: 30 min   Stress: Stress Concern Present (6/15/2023)    Citizen of [place] [employer] - Occupational Stress Questionnaire     Feeling of Stress : Very much   Social Connections: Socially Isolated (6/15/2023)    Social Connection and Isolation Panel [NHANES]     Frequency of Communication with Friends and Family: Twice a week     Frequency of Social Gatherings with Friends and Family: Never     Attends [employer] Services: Never     Active Member of Clubs or Organizations: No     Attends Club or Organization Meetings: Patient declined     Marital Status: Living with partner   Intimate Partner Violence: Not on file   Housing Stability: High Risk (6/15/2023)    Housing Stability Vital Sign     Unable to Pay for Housing in the Last Year: Yes     Number of Places Lived in the Last Year: 1     Unstable Housing in the Last Year:  "No     Family History:    Family History   Problem Relation Age of Onset    No Known Problems Mother     No Known Problems Father     Cancer Maternal Grandmother     Diabetes Maternal Grandfather     Cancer Paternal Grandmother         breast cancer    Diabetes Paternal Grandfather      Medications:    Current Outpatient Medications on File Prior to Visit   Medication Sig Dispense Refill    amoxicillin-clavulanate (AUGMENTIN) 875-125 MG Tab Take 1 Tablet by mouth 2 times a day for 7 days. 14 Tablet 0    promethazine-dextromethorphan (PROMETHAZINE-DM) 6.25-15 MG/5ML syrup Take 5 mL by mouth every four hours as needed for Cough. 120 mL 0    polymixin-trimethoprim (POLYTRIM) 96018-6.1 UNIT/ML-% Solution Administer 1 Drop into the left eye every 4 hours for 7 days. 10 mL 0    neomycin sulf/polymyx B sulf/HC soln (CORTISPORIN HC SOL) 3.5-41914-4 Solution Administer 4 Drops into the left ear 4 times a day for 10 days. (Patient not taking: Reported on 12/21/2024) 10 mL 0    ibuprofen (MOTRIN) 800 MG Tab Take 1 Tab by mouth every 8 hours as needed (For cramping after delivery; do not give if patient is receiving ketorolac (Toradol)). (Patient not taking: Reported on 12/21/2024) 30 Tab 2     No current facility-administered medications on file prior to visit.     Allergies:    No Known Allergies      Vitals:    Vitals:    12/21/24 1019   BP: 126/72   BP Location: Left arm   Patient Position: Sitting   BP Cuff Size: Adult   Pulse: 84   Resp: 14   Temp: 36.4 °C (97.5 °F)   TempSrc: Temporal   SpO2: 97%   Weight: 117 kg (258 lb 9.6 oz)   Height: 1.702 m (5' 7\")       Physical Exam:    Constitutional: Vital signs reviewed. Appears well-developed and well-nourished. No acute distress.   Eyes: Sclera white, conjunctivae clear.   ENT: TTP all sinus regions bilaterally. Left TM is moderately erythematous. External ears normal. External auditory canals normal without discharge. Right TM translucent and non-bulging. Lips/teeth are " normal. Oral mucosa pink and moist. Posterior pharynx: WNL.  Neck: Neck supple.   Pulmonary/Chest: Respirations non-labored.   Musculoskeletal: Normal gait. No muscular atrophy or weakness.  Neurological: Alert and oriented to person, place, and time. Muscle tone normal. Coordination normal.   Skin: No rashes or lesions. Warm, dry, normal turgor.  Psychiatric: Normal mood and affect. Behavior is normal. Judgment and thought content normal.       Assessment / Plan & Medical Decision Makin. Acute left otitis media  - levoFLOXacin (LEVAQUIN) 500 MG tablet; 1 tab by mouth once a day x 7 days.  Dispense: 7 Tablet; Refill: 0    2. Acute bacterial sinusitis  - levoFLOXacin (LEVAQUIN) 500 MG tablet; 1 tab by mouth once a day x 7 days.  Dispense: 7 Tablet; Refill: 0       Discussed with her DDX, management options, and risks, benefits, and alternatives to treatment plan agreed upon.    Saw other provider on 24, was prescribed Augmentin 875 mg BID x 7 days, tapered Prednisone course x 5 days, Polytrim eye drops, and Promethazine-DM syrup, visit reviewed. Her left ear was not getting better, so she was prescribed Cortisporin Otic on 24 by other provider. The left ear pain is not improving despite all this. Her left eye conjunctivitis is improving with the antibiotic eye drops. She has nasal symptoms with purulent mucus from nose. She has finished Prednisone. She has no significant history of ear problems.    TTP all sinus regions bilaterally. Left TM is moderately erythematous.     Complicated condition due to persisting symptoms despite treatment with other provider on 24.    D/c Augmentin prescribed on 24 as she is not improving with this. Also d/c Cortisporin Otic as left ear canal is normal on exam today.    Agreeable to medication prescribed.    Discussed expected course of duration, time for improvement, and to seek follow-up in Emergency Room, urgent care, or with PCP if getting worse at  any time or not improving within expected time frame.

## 2024-12-27 ENCOUNTER — OFFICE VISIT (OUTPATIENT)
Dept: URGENT CARE | Facility: CLINIC | Age: 34
End: 2024-12-27
Payer: COMMERCIAL

## 2024-12-27 VITALS
WEIGHT: 254.63 LBS | SYSTOLIC BLOOD PRESSURE: 98 MMHG | DIASTOLIC BLOOD PRESSURE: 72 MMHG | HEIGHT: 67 IN | HEART RATE: 112 BPM | RESPIRATION RATE: 14 BRPM | OXYGEN SATURATION: 96 % | TEMPERATURE: 98.6 F | BODY MASS INDEX: 39.97 KG/M2

## 2024-12-27 DIAGNOSIS — H91.92 DECREASED HEARING, LEFT: ICD-10-CM

## 2024-12-27 DIAGNOSIS — H92.02 OTALGIA, LEFT: ICD-10-CM

## 2024-12-27 DIAGNOSIS — H69.92 DYSFUNCTION OF LEFT EUSTACHIAN TUBE: ICD-10-CM

## 2024-12-27 PROCEDURE — 99213 OFFICE O/P EST LOW 20 MIN: CPT

## 2024-12-27 PROCEDURE — 3078F DIAST BP <80 MM HG: CPT

## 2024-12-27 PROCEDURE — 3074F SYST BP LT 130 MM HG: CPT

## 2024-12-27 RX ORDER — CETIRIZINE HYDROCHLORIDE 10 MG/1
10 TABLET ORAL DAILY
Qty: 30 TABLET | Refills: 0 | Status: SHIPPED | OUTPATIENT
Start: 2024-12-27

## 2024-12-27 RX ORDER — FLUTICASONE PROPIONATE 50 MCG
1 SPRAY, SUSPENSION (ML) NASAL 2 TIMES DAILY
Qty: 9.9 ML | Refills: 0 | Status: SHIPPED | OUTPATIENT
Start: 2024-12-27 | End: 2025-01-26

## 2024-12-27 RX ORDER — CETIRIZINE HYDROCHLORIDE 10 MG/1
10 TABLET ORAL DAILY
Status: DISCONTINUED | OUTPATIENT
Start: 2024-12-27 | End: 2024-12-27

## 2024-12-27 ASSESSMENT — ENCOUNTER SYMPTOMS
CHILLS: 0
SINUS PAIN: 1
DOUBLE VISION: 0
SPUTUM PRODUCTION: 1
HEMOPTYSIS: 0
DIZZINESS: 0
SORE THROAT: 0
BLURRED VISION: 0
HEADACHES: 0
MYALGIAS: 0
WHEEZING: 0
COUGH: 1
SHORTNESS OF BREATH: 0
NAUSEA: 0
FEVER: 0
DIARRHEA: 0
PALPITATIONS: 0
TINGLING: 0
VOMITING: 0
WEAKNESS: 0

## 2024-12-28 NOTE — PROGRESS NOTES
Subjective:   Marie Poon is a 34 y.o. female who presents for Otalgia (FV from prev visits, no improvement on hearing with left ear)    Patient presents to the clinic for complaints of left ear pain and diminished hearing for the last 11 days. Was given Augmentin and Levaquin on her 2 previous visits, which has not resolved her left ear pain. Patient also has sinus congestion and cough with green mucus.  Taking sudafed and flonase which doesn't help. Has an appointment  with [facility], but not until first week of January. Has intermittent vertigo since onset of her ear symptoms also.   Patient denies fever, SOB, dizziness/lightheadedness, N/V/D, chills, bodyaches, or vision changes.    Otalgia   Associated symptoms include coughing. Pertinent negatives include no diarrhea, headaches, sore throat or vomiting.       Review of Systems   Constitutional:  Negative for chills, fever and malaise/fatigue.   HENT:  Positive for congestion, ear pain and sinus pain. Negative for sore throat.    Eyes:  Negative for blurred vision and double vision.   Respiratory:  Positive for cough and sputum production. Negative for hemoptysis, shortness of breath and wheezing.    Cardiovascular:  Negative for chest pain and palpitations.   Gastrointestinal:  Negative for diarrhea, nausea and vomiting.   Genitourinary:  Negative for dysuria.   Musculoskeletal:  Negative for myalgias.   Neurological:  Negative for dizziness, tingling, weakness and headaches.        Vertigo   All other systems reviewed and are negative.    Refer to HPI for additional details.    During this visit, appropriate PPE was worn, and hand hygiene was performed.    PMH:  has a past medical history of Anxiety, Depression, History of PTD at 28wk - C/S due to NRFHT (10/6/2017), and Supervision of high risk pregnancy in third trimester (10/6/2017).    MEDS:   Current Outpatient Medications:     Pseudoephedrine HCl (SUDAFED CONGESTION PO), Take  by mouth., Disp: ,  "Rfl:     fluticasone (FLONASE) 50 MCG/ACT nasal spray, Administer 1 Spray into affected nostril(S) 2 times a day for 60 doses., Disp: 9.9 mL, Rfl: 0    cetirizine (ZYRTEC ALLERGY) 10 MG Tab, Take 1 Tablet by mouth every day., Disp: 30 Tablet, Rfl: 0    levoFLOXacin (LEVAQUIN) 500 MG tablet, 1 tab by mouth once a day x 7 days., Disp: 7 Tablet, Rfl: 0    promethazine-dextromethorphan (PROMETHAZINE-DM) 6.25-15 MG/5ML syrup, Take 5 mL by mouth every four hours as needed for Cough. (Patient not taking: Reported on 12/27/2024), Disp: 120 mL, Rfl: 0    ALLERGIES: No Known Allergies  SURGHX:   Past Surgical History:   Procedure Laterality Date    REPEAT C SECTION W TUBAL LIGATION  4/11/2018    Procedure: REPEAT C SECTION WITH TUBAL LIGATION;  Surgeon: Chelsea High M.D.;  Location: LABOR AND DELIVERY;  Service: Obstetrics    PRIMARY C SECTION  10/5/2016    Procedure: PRIMARY C SECTION;  Surgeon: Margarette Arriaga M.D.;  Location: LABOR AND DELIVERY;  Service:      SOCHX:  reports that she quit smoking about 8 years ago. Her smoking use included cigarettes. She started smoking about 11 years ago. She has a 0.8 pack-year smoking history. She has never used smokeless tobacco. She reports that she does not currently use alcohol. She reports that she does not use drugs.    FH: Per HPI as applicable/pertinent.    Medications, Allergies, and current problem list reviewed today in Epic.     Objective:     BP 98/72   Pulse (!) 112   Temp 37 °C (98.6 °F) (Temporal)   Resp 14   Ht 1.702 m (5' 7\")   Wt 115 kg (254 lb 10.1 oz)   SpO2 96%     Physical Exam  Constitutional:       Appearance: Normal appearance. She is not ill-appearing or toxic-appearing.   HENT:      Head: Normocephalic.      Right Ear: Tympanic membrane, ear canal and external ear normal.      Left Ear: Tympanic membrane, ear canal and external ear normal.      Ears:      Comments: Fluid behind Left TM. No signs of AOM or AOE     Nose: Nose normal. No " congestion or rhinorrhea.      Mouth/Throat:      Mouth: Mucous membranes are moist.      Pharynx: Oropharynx is clear. No oropharyngeal exudate or posterior oropharyngeal erythema.   Eyes:      General:         Right eye: No discharge.         Left eye: No discharge.      Extraocular Movements: Extraocular movements intact.      Conjunctiva/sclera: Conjunctivae normal.      Pupils: Pupils are equal, round, and reactive to light.   Cardiovascular:      Rate and Rhythm: Normal rate and regular rhythm.      Pulses: Normal pulses.      Heart sounds: Normal heart sounds. No murmur heard.  Pulmonary:      Effort: Pulmonary effort is normal. No respiratory distress.      Breath sounds: Normal breath sounds. No wheezing or rhonchi.   Abdominal:      General: Abdomen is flat.   Musculoskeletal:         General: No signs of injury. Normal range of motion.      Cervical back: Normal range of motion. No rigidity.   Lymphadenopathy:      Cervical: No cervical adenopathy.   Skin:     General: Skin is warm and dry.   Neurological:      General: No focal deficit present.      Mental Status: She is alert and oriented to person, place, and time.      Motor: No weakness.         Assessment/Plan:     Diagnosis and associated orders:     1. Otalgia, left    2. Decreased hearing, left    3. Dysfunction of left eustachian tube  - fluticasone (FLONASE) 50 MCG/ACT nasal spray; Administer 1 Spray into affected nostril(S) 2 times a day for 60 doses.  Dispense: 9.9 mL; Refill: 0  - cetirizine (ZYRTEC ALLERGY) 10 MG Tab; Take 1 Tablet by mouth every day.  Dispense: 30 Tablet; Refill: 0    Other orders  - Pseudoephedrine HCl (SUDAFED CONGESTION PO); Take  by mouth.     Comments/MDM:     Discussed that likely etiology of the patient's symptoms is left eustachian tube defect.  Cetirizine daily and fluticasone twice daily prescribed for the patient.  Discussed with patient that likely she does not have AOM or AOE based on her physical exam.  But  there is fluid behind her TM.  Advised patient that she can continue the Sudafed that she has been taking as well as increasing her Flonase to twice a day.  Instructed patient to follow-up with her ENT per her upcoming appointment.  Patient agreeable to this plan of care.  All questions answered.  Return to clinic if symptoms persist or worsen.  Red flag symptoms warranting emergency medical services discussed, including but not limited to chest pain, SOB, dizziness or lightheadedness, vertigo that does not stop or that persists, difficulty breathing or swallowing, severe tractable headache, numbness or tingling, nausea with vomiting, and diarrhea         Differential diagnosis, natural history, supportive care, and indications for immediate follow-up discussed.    Advised the patient to follow-up with the primary care physician for recheck, reevaluation, and consideration of further management.    Instructed patient to seek emergency medical attention via calling EMS or going to the Emergency Room for red flag symptoms, including but not limited to: chest pain, palpitations, fever greater than 103F, shortness of breath, wheezing, new or worsened numbness/tingling, focal or unilateral weakness, and bloody vomit/stool.     Please note that this dictation was created using voice recognition software. I have made a reasonable attempt to correct obvious errors, but I expect that there are errors of grammar and possibly content that I did not discover before finalizing the note.    This note was electronically signed by JOHANNA Tubbs

## 2025-04-04 ENCOUNTER — OFFICE VISIT (OUTPATIENT)
Dept: BEHAVIORAL HEALTH | Facility: CLINIC | Age: 35
End: 2025-04-04
Payer: COMMERCIAL

## 2025-04-04 DIAGNOSIS — F43.9 TRAUMA AND STRESSOR-RELATED DISORDER: ICD-10-CM

## 2025-04-04 DIAGNOSIS — F41.1 GENERALIZED ANXIETY DISORDER: ICD-10-CM

## 2025-04-04 DIAGNOSIS — F34.1 PERSISTENT DEPRESSIVE DISORDER: ICD-10-CM

## 2025-04-04 PROCEDURE — 99214 OFFICE O/P EST MOD 30 MIN: CPT | Mod: GC | Performed by: PSYCHIATRY & NEUROLOGY

## 2025-04-04 RX ORDER — BUSPIRONE HYDROCHLORIDE 5 MG/1
TABLET ORAL
Qty: 126 TABLET | Refills: 0 | Status: SHIPPED | OUTPATIENT
Start: 2025-04-04 | End: 2025-05-02

## 2025-04-04 ASSESSMENT — LIFESTYLE VARIABLES: SUBSTANCE_ABUSE: 0

## 2025-04-04 ASSESSMENT — ENCOUNTER SYMPTOMS
WHEEZING: 0
FALLS: 0
HALLUCINATIONS: 0
FEVER: 0
SEIZURES: 0
BLOOD IN STOOL: 0
BLURRED VISION: 0
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
## 2025-04-21 ENCOUNTER — OFFICE VISIT (OUTPATIENT)
Dept: MEDICAL GROUP | Facility: CLINIC | Age: 35
End: 2025-04-21
Payer: COMMERCIAL

## 2025-04-21 DIAGNOSIS — F41.9 ANXIETY AND DEPRESSION: ICD-10-CM

## 2025-04-21 DIAGNOSIS — F32.A ANXIETY AND DEPRESSION: ICD-10-CM
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
## 2025-05-09 ENCOUNTER — APPOINTMENT (OUTPATIENT)
Dept: BEHAVIORAL HEALTH | Facility: CLINIC | Age: 35
End: 2025-05-09
Payer: COMMERCIAL

## 2025-05-15 ENCOUNTER — TELEPHONE (OUTPATIENT)
Dept: BEHAVIORAL HEALTH | Facility: CLINIC | Age: 35
End: 2025-05-15
Payer: COMMERCIAL

## 2025-05-20 ENCOUNTER — TELEPHONE (OUTPATIENT)
Dept: BEHAVIORAL HEALTH | Facility: CLINIC | Age: 35
End: 2025-05-20
Payer: COMMERCIAL

## 2025-07-23 ENCOUNTER — OFFICE VISIT (OUTPATIENT)
Dept: URGENT CARE | Facility: PHYSICIAN GROUP | Age: 35
End: 2025-07-23
Payer: COMMERCIAL

## 2025-07-23 VITALS
BODY MASS INDEX: 40.97 KG/M2 | RESPIRATION RATE: 16 BRPM | HEART RATE: 127 BPM | DIASTOLIC BLOOD PRESSURE: 70 MMHG | SYSTOLIC BLOOD PRESSURE: 122 MMHG | HEIGHT: 67 IN | OXYGEN SATURATION: 96 % | WEIGHT: 261 LBS | TEMPERATURE: 97.7 F

## 2025-07-23 DIAGNOSIS — R00.0 TACHYCARDIA, UNSPECIFIED: ICD-10-CM

## 2025-07-23 DIAGNOSIS — R50.9 FEVER AND CHILLS: ICD-10-CM

## 2025-07-23 DIAGNOSIS — H66.002 ACUTE SUPPURATIVE OTITIS MEDIA OF LEFT EAR WITHOUT SPONTANEOUS RUPTURE OF TYMPANIC MEMBRANE, RECURRENCE NOT SPECIFIED: Primary | ICD-10-CM

## 2025-07-23 DIAGNOSIS — Z20.89 EXPOSURE TO PNEUMONIA: ICD-10-CM

## 2025-07-23 DIAGNOSIS — R05.9 COUGH, UNSPECIFIED TYPE: ICD-10-CM

## 2025-07-23 PROCEDURE — 3078F DIAST BP <80 MM HG: CPT | Performed by: PHYSICIAN ASSISTANT

## 2025-07-23 PROCEDURE — 99214 OFFICE O/P EST MOD 30 MIN: CPT | Performed by: PHYSICIAN ASSISTANT

## 2025-07-23 PROCEDURE — 3074F SYST BP LT 130 MM HG: CPT | Performed by: PHYSICIAN ASSISTANT

## 2025-07-23 RX ORDER — DEXTROMETHORPHAN HYDROBROMIDE AND PROMETHAZINE HYDROCHLORIDE 15; 6.25 MG/5ML; MG/5ML
5 SYRUP ORAL EVERY 4 HOURS PRN
Qty: 180 ML | Refills: 0 | Status: SHIPPED | OUTPATIENT
Start: 2025-07-23 | End: 2025-08-02

## 2025-07-23 RX ORDER — AZITHROMYCIN 250 MG/1
TABLET, FILM COATED ORAL
Qty: 6 TABLET | Refills: 0 | Status: SHIPPED | OUTPATIENT
Start: 2025-07-23

## 2025-07-23 ASSESSMENT — ENCOUNTER SYMPTOMS
MYALGIAS: 1
WHEEZING: 0
COUGH: 1
HEADACHES: 1
FEVER: 1
SORE THROAT: 0
SPUTUM PRODUCTION: 1
CHILLS: 1
SHORTNESS OF BREATH: 0

## 2025-07-23 NOTE — PROGRESS NOTES
"Subjective     Marie Poon is a 34 y.o. female who presents with Fever, Cough, and Otalgia (Pt states symptoms along with chest discomfort since yesterday. Child had pneumonia recently )            Patient presents with complaint of fever, cough and ear pain/pressure x 3 days, significantly worse since last night.  PT has taken some otc cough/cold medicine with little relief.  PT daughter diagnosed with atypical PNA last week, on zithromax.  Pt denies any other complaint.       URI   Associated symptoms include coughing, ear pain and headaches. Pertinent negatives include no sore throat or wheezing.       Review of Systems   Constitutional:  Positive for chills and fever.   HENT:  Positive for ear pain. Negative for ear discharge and sore throat.    Respiratory:  Positive for cough and sputum production. Negative for shortness of breath and wheezing.    Musculoskeletal:  Positive for myalgias.   Neurological:  Positive for headaches.   All other systems reviewed and are negative.             Objective     /70   Pulse (!) 127   Temp 36.5 °C (97.7 °F) (Temporal)   Resp 16   Ht 1.702 m (5' 7\")   Wt 118 kg (261 lb)   LMP 06/25/2025 (Approximate)   SpO2 96%   Breastfeeding No   BMI 40.88 kg/m²      Physical Exam  Vitals and nursing note reviewed.   Constitutional:       General: She is not in acute distress.     Appearance: Normal appearance. She is well-developed. She is not toxic-appearing or diaphoretic.   HENT:      Head: Normocephalic and atraumatic.      Right Ear: Tympanic membrane normal.      Left Ear: A middle ear effusion is present. Tympanic membrane is injected, erythematous and bulging.      Nose: Mucosal edema, congestion and rhinorrhea present. Rhinorrhea is clear.      Mouth/Throat:      Mouth: Mucous membranes are moist.      Pharynx: Uvula midline. Posterior oropharyngeal erythema present.      Tonsils: No tonsillar exudate.   Eyes:      General: Lids are normal.      Extraocular " Movements: Extraocular movements intact.      Conjunctiva/sclera:      Right eye: Right conjunctiva is injected.      Left eye: Left conjunctiva is injected.      Pupils: Pupils are equal, round, and reactive to light.   Cardiovascular:      Rate and Rhythm: Regular rhythm. Tachycardia present.      Pulses: Normal pulses.      Heart sounds: Normal heart sounds.   Pulmonary:      Effort: Pulmonary effort is normal. No respiratory distress.      Breath sounds: No wheezing or rales.   Abdominal:      Palpations: Abdomen is soft.   Musculoskeletal:         General: Normal range of motion.      Cervical back: Normal range of motion and neck supple.   Skin:     General: Skin is warm and dry.      Capillary Refill: Capillary refill takes less than 2 seconds.   Neurological:      General: No focal deficit present.      Mental Status: She is alert and oriented to person, place, and time.      Gait: Gait normal.   Psychiatric:         Mood and Affect: Mood normal.         Behavior: Behavior is cooperative.                                  Assessment & Plan  Acute suppurative otitis media of left ear without spontaneous rupture of tympanic membrane, recurrence not specified    Orders:    amoxicillin-clavulanate (AUGMENTIN) 875-125 MG Tab; Take 1 Tablet by mouth 2 times a day for 5 days.    azithromycin (ZITHROMAX) 250 MG Tab; Take 2 tabs by mouth once today, then one tab by mouth once daily days 2-5.  Complete all medication.    promethazine-dextromethorphan (PROMETHAZINE-DM) 6.25-15 MG/5ML syrup; Take 5 mL by mouth every four hours as needed for Cough for up to 10 days.    Fever and chills    Orders:    amoxicillin-clavulanate (AUGMENTIN) 875-125 MG Tab; Take 1 Tablet by mouth 2 times a day for 5 days.    azithromycin (ZITHROMAX) 250 MG Tab; Take 2 tabs by mouth once today, then one tab by mouth once daily days 2-5.  Complete all medication.    promethazine-dextromethorphan (PROMETHAZINE-DM) 6.25-15 MG/5ML syrup; Take 5 mL by  mouth every four hours as needed for Cough for up to 10 days.    Cough, unspecified type    Orders:    amoxicillin-clavulanate (AUGMENTIN) 875-125 MG Tab; Take 1 Tablet by mouth 2 times a day for 5 days.    azithromycin (ZITHROMAX) 250 MG Tab; Take 2 tabs by mouth once today, then one tab by mouth once daily days 2-5.  Complete all medication.    promethazine-dextromethorphan (PROMETHAZINE-DM) 6.25-15 MG/5ML syrup; Take 5 mL by mouth every four hours as needed for Cough for up to 10 days.    Exposure to pneumonia    Orders:    amoxicillin-clavulanate (AUGMENTIN) 875-125 MG Tab; Take 1 Tablet by mouth 2 times a day for 5 days.    azithromycin (ZITHROMAX) 250 MG Tab; Take 2 tabs by mouth once today, then one tab by mouth once daily days 2-5.  Complete all medication.    promethazine-dextromethorphan (PROMETHAZINE-DM) 6.25-15 MG/5ML syrup; Take 5 mL by mouth every four hours as needed for Cough for up to 10 days.    Tachycardia, unspecified    Orders:    amoxicillin-clavulanate (AUGMENTIN) 875-125 MG Tab; Take 1 Tablet by mouth 2 times a day for 5 days.    azithromycin (ZITHROMAX) 250 MG Tab; Take 2 tabs by mouth once today, then one tab by mouth once daily days 2-5.  Complete all medication.    promethazine-dextromethorphan (PROMETHAZINE-DM) 6.25-15 MG/5ML syrup; Take 5 mL by mouth every four hours as needed for Cough for up to 10 days.       PT HPI and PE are consistent with acute otitis media of left ear, as well as possible atypical PNA (02 sat 96%, tachycardia, dry cough, body aches, + exposure at home).  I will treat with dual therapy :Augmentin and zithromax x 5 days.     Rx cough syrup also sent to pharmacy for cough.     PT politely declined covid test and xray.    Differential diagnosis, supportive care, and indications for immediate follow-up discussed with patient.  Instructed to return to clinic or nearest emergency department for any change in condition, further concerns, or worsening of symptoms.    I  personally reviewed prior external notes and test results pertinent to today's visit.  I have independently reviewed and interpreted all diagnostics ordered during this urgent care visit.    PT should follow up with PCP in 1-2 days for re-evaluation if symptoms have not improved.      Discussed red flags and reasons to return to UC or ED.      Pt and/or family verbalized understanding of diagnosis and follow up instructions and was offered informational handout on diagnosis.  PT discharged.     Please note that this dictation was created using voice recognition software. I have made every reasonable attempt to correct obvious errors, but I expect that there may be errors of grammar and possibly content that I did not discover before finalizing the note.

## 2025-07-25 ENCOUNTER — APPOINTMENT (OUTPATIENT)
Dept: MEDICAL GROUP | Facility: CLINIC | Age: 35
End: 2025-07-25
Payer: COMMERCIAL

## 2025-08-28 ENCOUNTER — OFFICE VISIT (OUTPATIENT)
Dept: MEDICAL GROUP | Facility: PHYSICIAN GROUP | Age: 35
End: 2025-08-28
Payer: COMMERCIAL

## 2025-08-28 VITALS
OXYGEN SATURATION: 97 % | TEMPERATURE: 98 F | WEIGHT: 263 LBS | HEIGHT: 67 IN | DIASTOLIC BLOOD PRESSURE: 64 MMHG | RESPIRATION RATE: 18 BRPM | BODY MASS INDEX: 41.28 KG/M2 | SYSTOLIC BLOOD PRESSURE: 128 MMHG | HEART RATE: 81 BPM

## 2025-08-28 DIAGNOSIS — E55.9 VITAMIN D DEFICIENCY: ICD-10-CM

## 2025-08-28 DIAGNOSIS — F41.1 GENERALIZED ANXIETY DISORDER: ICD-10-CM

## 2025-08-28 DIAGNOSIS — R87.622 PAPANICOLAOU SMEAR OF VAGINA WITH LOW GRADE SQUAMOUS INTRAEPITHELIAL LESION (LGSIL): Primary | ICD-10-CM

## 2025-08-28 DIAGNOSIS — Z00.00 WELLNESS EXAMINATION: ICD-10-CM

## 2025-08-28 DIAGNOSIS — F39 UNSPECIFIED MOOD (AFFECTIVE) DISORDER (HCC): ICD-10-CM

## 2025-08-28 DIAGNOSIS — Z11.59 NEED FOR HEPATITIS C SCREENING TEST: ICD-10-CM

## 2025-08-28 DIAGNOSIS — R22.32 LUMP OF SKIN OF UPPER EXTREMITY, LEFT: ICD-10-CM

## 2025-08-28 PROCEDURE — 3078F DIAST BP <80 MM HG: CPT | Performed by: FAMILY MEDICINE

## 2025-08-28 PROCEDURE — 3074F SYST BP LT 130 MM HG: CPT | Performed by: FAMILY MEDICINE

## 2025-08-28 PROCEDURE — 99214 OFFICE O/P EST MOD 30 MIN: CPT | Performed by: FAMILY MEDICINE

## 2025-08-28 ASSESSMENT — LIFESTYLE VARIABLES
HOW MANY STANDARD DRINKS CONTAINING ALCOHOL DO YOU HAVE ON A TYPICAL DAY: PATIENT DOES NOT DRINK
SKIP TO QUESTIONS 9-10: 1
AUDIT-C TOTAL SCORE: 0
HOW OFTEN DO YOU HAVE A DRINK CONTAINING ALCOHOL: NEVER
HOW OFTEN DO YOU HAVE SIX OR MORE DRINKS ON ONE OCCASION: NEVER

## 2025-08-28 ASSESSMENT — PATIENT HEALTH QUESTIONNAIRE - PHQ9
9. THOUGHTS THAT YOU WOULD BE BETTER OFF DEAD, OR OF HURTING YOURSELF: NOT AT ALL
6. FEELING BAD ABOUT YOURSELF - OR THAT YOU ARE A FAILURE OR HAVE LET YOURSELF OR YOUR FAMILY DOWN: NEARLY EVERY DAY
1. LITTLE INTEREST OR PLEASURE IN DOING THINGS: NEARLY EVERY DAY
7. TROUBLE CONCENTRATING ON THINGS, SUCH AS READING THE NEWSPAPER OR WATCHING TELEVISION: NEARLY EVERY DAY
8. MOVING OR SPEAKING SO SLOWLY THAT OTHER PEOPLE COULD HAVE NOTICED. OR THE OPPOSITE, BEING SO FIGETY OR RESTLESS THAT YOU HAVE BEEN MOVING AROUND A LOT MORE THAN USUAL: NOT AT ALL
2. FEELING DOWN, DEPRESSED, IRRITABLE, OR HOPELESS: NEARLY EVERY DAY
SUM OF ALL RESPONSES TO PHQ QUESTIONS 1-9: 21
SUM OF ALL RESPONSES TO PHQ9 QUESTIONS 1 AND 2: 6
5. POOR APPETITE OR OVEREATING: NEARLY EVERY DAY
3. TROUBLE FALLING OR STAYING ASLEEP OR SLEEPING TOO MUCH: NEARLY EVERY DAY
4. FEELING TIRED OR HAVING LITTLE ENERGY: NEARLY EVERY DAY

## (undated) DEVICE — HEAD HOLDER JUNIOR/ADULT

## (undated) DEVICE — LACTATED RINGERS INJ 1000 ML - (14EA/CA 60CA/PF)

## (undated) DEVICE — TRAY SPINAL ANESTHESIA NON-SAFETY (10/CA)

## (undated) DEVICE — TAPE CLOTH MEDIPORE 6 INCH - (12RL/CA)

## (undated) DEVICE — DETERGENT RENUZYME PLUS 10 OZ PACKET (50/BX)

## (undated) DEVICE — SLEEVE, SEQUENTIAL CALF REG

## (undated) DEVICE — SUTURE 1 VICRYL PLUS CTX - 36 INCH (36/BX)

## (undated) DEVICE — SUTURE 0 GUT-PLAIN (36PK/BX)

## (undated) DEVICE — PACK ROOM TURNOVER L&D (12/CA)

## (undated) DEVICE — SUTURE 1 VICRYL PLUS CT-1 - 36 INCH (36/BX)

## (undated) DEVICE — CHLORAPREP 26 ML APPLICATOR - ORANGE TINT(25/CA)

## (undated) DEVICE — WATER IRRIG. STER. 1500 ML - (9/CA)

## (undated) DEVICE — SUTURE3-0 36IN VCRLY PLS ANTI (36PK/BX)

## (undated) DEVICE — GLOVE BIOGEL SZ 7 SURGICAL PF LTX - (50PR/BX 4BX/CA)

## (undated) DEVICE — BLANKET UNDERBODY FULL ACCES - (5/CA)

## (undated) DEVICE — SUTURE 3-0 VICRYL PLUS CT-1 - 36 INCH (36/BX)

## (undated) DEVICE — CATHETER IV NON-SAFETY 18 GA X 1 1/4 (50/BX 4BX/CA)

## (undated) DEVICE — GLOVE BIOGEL SZ 6.5 SURGICAL PF LTX (50PR/BX 4BX/CA)

## (undated) DEVICE — SET EXTENSION WITH 2 PORTS (48EA/CA) ***PART #2C8610 IS A SUBSTITUTE*****

## (undated) DEVICE — PENCIL ELECTSURG 10FT HLSTR - WITH BLADE (50EA/CA)

## (undated) DEVICE — TUBING CLEARLINK DUO-VENT - C-FLO (48EA/CA)

## (undated) DEVICE — RETRACTOR O C SECTION LRY - (5/BX)

## (undated) DEVICE — CANISTER SUCTION 3000ML MECHANICAL FILTER AUTO SHUTOFF MEDI-VAC NONSTERILE LF DISP  (40EA/CA)

## (undated) DEVICE — SUTURE 4-0 MONOCRYL PLUS PS-1 - 27 INCH (36/BX)

## (undated) DEVICE — SODIUM CHL IRRIGATION 0.9% 1000ML (12EA/CA)

## (undated) DEVICE — TRAY BLADDER CARE W/ 16 FR FOLEY CATHETER STATLOCK  (10/CA)

## (undated) DEVICE — PACK C-SECTION (2EA/CA)

## (undated) DEVICE — KIT  I.V. START (100EA/CA)